# Patient Record
Sex: MALE | Race: WHITE | NOT HISPANIC OR LATINO | Employment: OTHER
[De-identification: names, ages, dates, MRNs, and addresses within clinical notes are randomized per-mention and may not be internally consistent; named-entity substitution may affect disease eponyms.]

---

## 2017-02-07 ENCOUNTER — MYAURORA ACCOUNT LINK (OUTPATIENT)
Dept: OTHER | Age: 67
End: 2017-02-07

## 2017-02-07 ENCOUNTER — CHARTING TRANS (OUTPATIENT)
Dept: OTHER | Age: 67
End: 2017-02-07

## 2018-05-05 ENCOUNTER — LAB SERVICES (OUTPATIENT)
Dept: OTHER | Age: 68
End: 2018-05-05

## 2018-05-05 ENCOUNTER — IMAGING SERVICES (OUTPATIENT)
Dept: OTHER | Age: 68
End: 2018-05-05

## 2018-05-05 ENCOUNTER — HOSPITAL (OUTPATIENT)
Dept: OTHER | Age: 68
End: 2018-05-05
Attending: INTERNAL MEDICINE

## 2018-05-05 ENCOUNTER — CHARTING TRANS (OUTPATIENT)
Dept: OTHER | Age: 68
End: 2018-05-05

## 2018-05-05 LAB
ALBUMIN SERPL-MCNC: 4 G/DL (ref 3.6–5.1)
ALBUMIN SERPL-MCNC: 4 GM/DL (ref 3.6–5.1)
ALBUMIN/GLOB SERPL: 1.2 (ref 1–2.4)
ALBUMIN/GLOB SERPL: 1.2 {RATIO} (ref 1–2.4)
ALP SERPL-CCNC: 93 UNIT/L (ref 45–117)
ALP SERPL-CCNC: 93 UNITS/L (ref 45–117)
ALT SERPL-CCNC: 35 UNIT/L
ALT SERPL-CCNC: 35 UNITS/L
ANALYZER ANC (IANC): NORMAL
ANALYZER ANC (IANC): NORMAL
ANION GAP SERPL CALC-SCNC: 13 MMOL/L (ref 10–20)
ANION GAP SERPL CALC-SCNC: 13 MMOL/L (ref 10–20)
APTT PPP: 33 SEC (ref 22–30)
APTT PPP: 33 SECONDS (ref 22–30)
APTT PPP: ABNORMAL
APTT PPP: ABNORMAL S
AST SERPL-CCNC: 51 UNIT/L
AST SERPL-CCNC: 51 UNITS/L
BASOPHILS # BLD: 0 K/MCL (ref 0–0.3)
BASOPHILS # BLD: 0 THOUSAND/MCL (ref 0–0.3)
BASOPHILS NFR BLD: 0 %
BASOPHILS NFR BLD: 0 %
BILIRUB SERPL-MCNC: 0.6 MG/DL (ref 0.2–1)
BILIRUB SERPL-MCNC: 0.6 MG/DL (ref 0.2–1)
BUN SERPL-MCNC: 15 MG/DL (ref 6–20)
BUN SERPL-MCNC: 15 MG/DL (ref 6–20)
BUN/CREAT SERPL: 17 (ref 7–25)
BUN/CREAT SERPL: 17 (ref 7–25)
CALCIUM SERPL-MCNC: 9.2 MG/DL (ref 8.4–10.2)
CALCIUM SERPL-MCNC: 9.2 MG/DL (ref 8.4–10.2)
CHLORIDE SERPL-SCNC: 107 MMOL/L (ref 98–107)
CHLORIDE: 107 MMOL/L (ref 98–107)
CO2 SERPL-SCNC: 24 MMOL/L (ref 21–32)
CO2 SERPL-SCNC: 24 MMOL/L (ref 21–32)
CREAT SERPL-MCNC: 0.9 MG/DL (ref 0.67–1.17)
CREAT SERPL-MCNC: 0.9 MG/DL (ref 0.67–1.17)
DIFFERENTIAL METHOD BLD: NORMAL
DIFFERENTIAL METHOD BLD: NORMAL
EOSINOPHIL # BLD: 0.3 K/MCL (ref 0.1–0.5)
EOSINOPHIL # BLD: 0.3 THOUSAND/MCL (ref 0.1–0.5)
EOSINOPHIL NFR BLD: 4 %
EOSINOPHIL NFR BLD: 4 %
ERYTHROCYTE [DISTWIDTH] IN BLOOD: 12.7 % (ref 11–15)
ERYTHROCYTE [DISTWIDTH] IN BLOOD: 12.7 % (ref 11–15)
GLOBULIN SER-MCNC: 3.3 G/DL (ref 2–4)
GLOBULIN SER-MCNC: 3.3 GM/DL (ref 2–4)
GLUCOSE BLDC GLUCOMTR-MCNC: 86 MG/DL (ref 65–99)
GLUCOSE SERPL-MCNC: 98 MG/DL (ref 65–99)
GLUCOSE SERPL-MCNC: 98 MG/DL (ref 65–99)
HEMATOCRIT: 46.5 % (ref 39–51)
HEMATOCRIT: 46.5 % (ref 39–51)
HEMOGLOBIN: 15.8 G/DL (ref 13–17)
HGB BLD-MCNC: 15.8 GM/DL (ref 13–17)
INR PPP: 1
INR PPP: 1
LYMPHOCYTES # BLD: 1.7 K/MCL (ref 1–4)
LYMPHOCYTES # BLD: 1.7 THOUSAND/MCL (ref 1–4)
LYMPHOCYTES NFR BLD: 23 %
LYMPHOCYTES NFR BLD: 23 %
MAGNESIUM SERPL-MCNC: 1.8 MG/DL (ref 1.7–2.4)
MAGNESIUM SERPL-MCNC: 1.8 MG/DL (ref 1.7–2.4)
MCH RBC QN AUTO: 31.2 PG (ref 26–34)
MCHC RBC AUTO-ENTMCNC: 34 GM/DL (ref 32–36.5)
MCV RBC AUTO: 91.7 FL (ref 78–100)
MEAN CORPUSCULAR HEMOGLOBIN: 31.2 PG (ref 26–34)
MEAN CORPUSCULAR HGB CONC: 34 G/DL (ref 32–36.5)
MEAN CORPUSCULAR VOLUME: 91.7 FL (ref 78–100)
MONOCYTES # BLD: 0.9 K/MCL (ref 0.3–0.9)
MONOCYTES # BLD: 0.9 THOUSAND/MCL (ref 0.3–0.9)
MONOCYTES NFR BLD: 12 %
MONOCYTES NFR BLD: 12 %
NEUTROPHILS # BLD: 4.6 K/MCL (ref 1.8–7.7)
NEUTROPHILS # BLD: 4.6 THOUSAND/MCL (ref 1.8–7.7)
NEUTROPHILS NFR BLD: 61 %
NEUTROPHILS NFR BLD: 61 %
NEUTS SEG NFR BLD: NORMAL
NEUTS SEG NFR BLD: NORMAL %
NRBC (NRBCRE): NORMAL
PERCENT NRBC: NORMAL
PLATELET # BLD: 226 THOUSAND/MCL (ref 140–450)
PLATELET COUNT: 226 K/MCL (ref 140–450)
POTASSIUM SERPL-SCNC: 4.7 MMOL/L (ref 3.4–5.1)
POTASSIUM SERPL-SCNC: 4.7 MMOL/L (ref 3.4–5.1)
PROT SERPL-MCNC: 7.3 GM/DL (ref 6.4–8.2)
PROTHROMBIN TIME (PRT2): NORMAL
PROTHROMBIN TIME: 10.5 SEC (ref 9.7–11.8)
PROTHROMBIN TIME: 10.5 SECONDS (ref 9.7–11.8)
PROTHROMBIN TIME: NORMAL
RBC # BLD: 5.07 MILLION/MCL (ref 4.5–5.9)
RED CELL COUNT: 5.07 MIL/MCL (ref 4.5–5.9)
SODIUM SERPL-SCNC: 139 MMOL/L (ref 135–145)
SODIUM SERPL-SCNC: 139 MMOL/L (ref 135–145)
TOTAL PROTEIN: 7.3 G/DL (ref 6.4–8.2)
TROPONIN I SERPL HS-MCNC: 3.39 NG/ML
TROPONIN I SERPL HS-MCNC: 3.39 NG/ML
TROPONIN I SERPL HS-MCNC: 4.54 NG/ML
TROPONIN I SERPL HS-MCNC: 4.54 NG/ML
TROPONIN I SERPL HS-MCNC: 5.42 NG/ML
TROPONIN I SERPL HS-MCNC: 5.42 NG/ML
WBC # BLD: 7.6 THOUSAND/MCL (ref 4.2–11)
WHITE BLOOD COUNT: 7.6 K/MCL (ref 4.2–11)

## 2018-05-06 LAB
ANALYZER ANC (IANC): NORMAL
ANION GAP SERPL CALC-SCNC: 12 MMOL/L (ref 10–20)
BASOPHILS # BLD: 0 THOUSAND/MCL (ref 0–0.3)
BASOPHILS NFR BLD: 0 %
BUN SERPL-MCNC: 13 MG/DL (ref 6–20)
BUN/CREAT SERPL: 14 (ref 7–25)
CALCIUM SERPL-MCNC: 8.4 MG/DL (ref 8.4–10.2)
CHLORIDE: 107 MMOL/L (ref 98–107)
CHOLEST SERPL-MCNC: 172 MG/DL
CHOLEST/HDLC SERPL: 4.9 {RATIO}
CO2 SERPL-SCNC: 26 MMOL/L (ref 21–32)
CREAT SERPL-MCNC: 0.93 MG/DL (ref 0.67–1.17)
DIFFERENTIAL METHOD BLD: NORMAL
EOSINOPHIL # BLD: 0.3 THOUSAND/MCL (ref 0.1–0.5)
EOSINOPHIL NFR BLD: 5 %
ERYTHROCYTE [DISTWIDTH] IN BLOOD: 12.6 % (ref 11–15)
GLUCOSE SERPL-MCNC: 96 MG/DL (ref 65–99)
GLYCOHEMOGLOBIN: 5.3 % (ref 4.5–5.6)
HDLC SERPL-MCNC: 35 MG/DL
HEMATOCRIT: 44.2 % (ref 39–51)
HGB BLD-MCNC: 15.4 GM/DL (ref 13–17)
LDLC SERPL CALC-MCNC: 106 MG/DL
LYMPHOCYTES # BLD: 1.8 THOUSAND/MCL (ref 1–4)
LYMPHOCYTES NFR BLD: 26 %
MCH RBC QN AUTO: 32.4 PG (ref 26–34)
MCHC RBC AUTO-ENTMCNC: 34.8 GM/DL (ref 32–36.5)
MCV RBC AUTO: 93.1 FL (ref 78–100)
MONOCYTES # BLD: 0.8 THOUSAND/MCL (ref 0.3–0.9)
MONOCYTES NFR BLD: 12 %
NEUTROPHILS # BLD: 3.8 THOUSAND/MCL (ref 1.8–7.7)
NEUTROPHILS NFR BLD: 57 %
NEUTS SEG NFR BLD: NORMAL %
NONHDLC SERPL-MCNC: 137 MG/DL
PERCENT NRBC: NORMAL
PLATELET # BLD: 200 THOUSAND/MCL (ref 140–450)
POTASSIUM SERPL-SCNC: 4.2 MMOL/L (ref 3.4–5.1)
RBC # BLD: 4.75 MILLION/MCL (ref 4.5–5.9)
SODIUM SERPL-SCNC: 141 MMOL/L (ref 135–145)
TRIGLYCERIDE (TRIGP): 153 MG/DL
WBC # BLD: 6.7 THOUSAND/MCL (ref 4.2–11)

## 2018-05-07 ENCOUNTER — DIAGNOSTIC TRANS (OUTPATIENT)
Dept: OTHER | Age: 68
End: 2018-05-07

## 2018-05-07 ENCOUNTER — CHARTING TRANS (OUTPATIENT)
Dept: OTHER | Age: 68
End: 2018-05-07

## 2018-05-07 LAB
APPEARANCE UR: CLEAR
BACTERIA #/AREA URNS HPF: NORMAL /HPF
BILIRUB UR QL: NEGATIVE
COLOR UR: YELLOW
GLUCOSE UR-MCNC: NEGATIVE MG/DL
HGB UR QL: NEGATIVE
HYALINE CASTS #/AREA URNS LPF: NORMAL /LPF (ref 0–5)
KETONES UR-MCNC: NEGATIVE MG/DL
LEUKOCYTE ESTERASE UR QL STRIP: NEGATIVE
NITRITE UR QL: NEGATIVE
PH UR: 6 UNIT (ref 5–7)
PROT UR QL: NEGATIVE MG/DL
RBC #/AREA URNS HPF: NORMAL /HPF (ref 0–3)
SP GR UR: 1.02 (ref 1–1.03)
SPECIMEN SOURCE: NORMAL
SQUAMOUS #/AREA URNS HPF: NORMAL /HPF (ref 0–5)
UROBILINOGEN UR QL: 0.2 MG/DL (ref 0–1)
WBC #/AREA URNS HPF: NORMAL /HPF (ref 0–5)

## 2018-05-08 LAB
ANALYZER ANC (IANC): ABNORMAL
ANALYZER ANC (IANC): ABNORMAL
ANION GAP SERPL CALC-SCNC: 13 MMOL/L (ref 10–20)
APTT PPP: 35 SECONDS (ref 22–30)
APTT PPP: 35 SECONDS (ref 22–30)
APTT PPP: ABNORMAL S
APTT PPP: ABNORMAL S
BASE DEFICIT BLDA-SCNC: 0 MMOL/L (ref 0–2)
BASE DEFICIT BLDA-SCNC: 1 MMOL/L (ref 0–2)
BASE DEFICIT BLDA-SCNC: 2 MMOL/L (ref 0–2)
BASE DEFICIT BLDA-SCNC: 2 MMOL/L (ref 0–2)
BASE DEFICIT BLDA-SCNC: ABNORMAL MMOL/L
BASE EXCESS BLDA CALC-SCNC: 0 MMOL/L (ref 0–3)
BASE EXCESS BLDA CALC-SCNC: 1 MMOL/L (ref 0–3)
BASE EXCESS BLDA CALC-SCNC: 3 MMOL/L (ref 0–3)
BASE EXCESS BLDA CALC-SCNC: ABNORMAL MMOL/L
BASOPHILS # BLD: 0 THOUSAND/MCL (ref 0–0.3)
BASOPHILS # BLD: 0 THOUSAND/MCL (ref 0–0.3)
BASOPHILS NFR BLD: 0 %
BASOPHILS NFR BLD: 0 %
BDY SITE: ABNORMAL
BODY TEMPERATURE: 37 DEGREES
BUN SERPL-MCNC: 13 MG/DL (ref 6–20)
BUN/CREAT SERPL: 15 (ref 7–25)
CA-I BLD ISE-SCNC: 1.02 MMOL/L (ref 1.15–1.29)
CA-I BLD ISE-SCNC: 1.05 MMOL/L (ref 1.15–1.29)
CA-I BLD ISE-SCNC: 1.06 MMOL/L (ref 1.15–1.29)
CA-I BLD ISE-SCNC: 1.06 MMOL/L (ref 1.15–1.29)
CA-I BLD ISE-SCNC: 1.08 MMOL/L (ref 1.15–1.29)
CA-I BLD ISE-SCNC: 1.12 MMOL/L (ref 1.15–1.29)
CA-I BLD ISE-SCNC: 1.14 MMOL/L (ref 1.15–1.29)
CA-I BLD ISE-SCNC: 1.18 MMOL/L (ref 1.15–1.29)
CA-I BLD ISE-SCNC: 1.22 MMOL/L (ref 1.15–1.29)
CA-I BLDA-SCNC: 22 % (ref 15–23)
CALCIUM SERPL-MCNC: 8 MG/DL (ref 8.4–10.2)
CHLORIDE BLD-SCNC: 105 MMOL/L (ref 98–107)
CHLORIDE BLD-SCNC: 106 MMOL/L (ref 98–107)
CHLORIDE BLD-SCNC: 107 MMOL/L (ref 98–107)
CHLORIDE BLD-SCNC: 108 MMOL/L (ref 98–107)
CHLORIDE: 109 MMOL/L (ref 98–107)
CO2 BLD-SCNC: 24 MMOL/L (ref 19–24)
CO2 BLD-SCNC: 27 MMOL/L (ref 19–24)
CO2 BLD-SCNC: 27 MMOL/L (ref 19–24)
CO2 BLD-SCNC: 28 MMOL/L (ref 19–24)
CO2 BLD-SCNC: 29 MMOL/L (ref 19–24)
CO2 BLD-SCNC: 30 MMOL/L (ref 19–24)
CO2 SERPL-SCNC: 26 MMOL/L (ref 21–32)
COHGB MFR BLD: 1.8 %
COHGB MFR BLD: 2 %
COHGB MFR BLD: 2.1 %
CONDITION: ABNORMAL
CREAT SERPL-MCNC: 0.88 MG/DL (ref 0.67–1.17)
DIFFERENTIAL METHOD BLD: ABNORMAL
DIFFERENTIAL METHOD BLD: ABNORMAL
EOSINOPHIL # BLD: 0.2 THOUSAND/MCL (ref 0.1–0.5)
EOSINOPHIL # BLD: 0.2 THOUSAND/MCL (ref 0.1–0.5)
EOSINOPHIL NFR BLD: 1 %
EOSINOPHIL NFR BLD: 1 %
ERYTHROCYTE [DISTWIDTH] IN BLOOD: 12.5 % (ref 11–15)
ERYTHROCYTE [DISTWIDTH] IN BLOOD: 12.7 % (ref 11–15)
GLUCOSE BLD-MCNC: 106 MG/DL (ref 65–99)
GLUCOSE BLD-MCNC: 109 MG/DL (ref 65–99)
GLUCOSE BLD-MCNC: 127 MG/DL (ref 65–99)
GLUCOSE BLD-MCNC: 130 MG/DL (ref 65–99)
GLUCOSE BLD-MCNC: 135 MG/DL (ref 65–99)
GLUCOSE BLD-MCNC: 149 MG/DL (ref 65–99)
GLUCOSE BLD-MCNC: 153 MG/DL (ref 65–99)
GLUCOSE BLD-MCNC: 154 MG/DL (ref 65–99)
GLUCOSE BLD-MCNC: 96 MG/DL (ref 65–99)
GLUCOSE BLDC GLUCOMTR-MCNC: 113 MG/DL (ref 65–99)
GLUCOSE BLDC GLUCOMTR-MCNC: 124 MG/DL (ref 65–99)
GLUCOSE BLDC GLUCOMTR-MCNC: 136 MG/DL (ref 65–99)
GLUCOSE BLDC GLUCOMTR-MCNC: 138 MG/DL (ref 65–99)
GLUCOSE BLDC GLUCOMTR-MCNC: 155 MG/DL (ref 65–99)
GLUCOSE BLDC GLUCOMTR-MCNC: 162 MG/DL (ref 65–99)
GLUCOSE SERPL-MCNC: 112 MG/DL (ref 65–99)
HCO3 BLDA-SCNC: 23 MMOL/L (ref 22–28)
HCO3 BLDA-SCNC: 23 MMOL/L (ref 22–28)
HCO3 BLDA-SCNC: 25 MMOL/L (ref 22–28)
HCO3 BLDA-SCNC: 25 MMOL/L (ref 22–28)
HCO3 BLDA-SCNC: 26 MMOL/L (ref 22–28)
HCO3 BLDA-SCNC: 27 MMOL/L (ref 22–28)
HCO3 BLDA-SCNC: 29 MMOL/L (ref 22–28)
HCT VFR BLD CALC: 34 % (ref 39–51)
HCT VFR BLD CALC: 35 % (ref 39–51)
HCT VFR BLD CALC: 36 % (ref 39–51)
HCT VFR BLD CALC: 44 % (ref 39–51)
HEMATOCRIT: 34.7 % (ref 39–51)
HEMATOCRIT: 42.7 % (ref 39–51)
HGB BLD-MCNC: 11.4 GM/DL (ref 13–17)
HGB BLD-MCNC: 11.5 GM/DL (ref 13–17)
HGB BLD-MCNC: 11.5 GM/DL (ref 13–17)
HGB BLD-MCNC: 11.7 GM/DL (ref 13–17)
HGB BLD-MCNC: 11.9 GM/DL (ref 13–17)
HGB BLD-MCNC: 12.2 GM/DL (ref 13–17)
HGB BLD-MCNC: 14.7 GM/DL (ref 13–17)
HGB BLD-MCNC: 14.9 GM/DL (ref 13–17)
HGB BLD-MCNC: 15.2 GM/DL (ref 13–17)
HGB BLD-MCNC: 16.5 GM/DL (ref 13–17)
HGB BLD-MCNC: 16.7 GM/DL (ref 13–17)
HOROWITZ INDEX BLD+IHG-RTO: 100 %
HOROWITZ INDEX BLD+IHG-RTO: 40 %
HOROWITZ INDEX BLD+IHG-RTO: 50 %
INR PPP: 1.2
INR PPP: 1.4
LACTATE BLDA-MCNC: 1.1 MMOL/L
LACTATE BLDA-MCNC: 1.2 MMOL/L
LACTATE BLDA-MCNC: 1.2 MMOL/L
LACTATE BLDA-MCNC: 1.3 MMOL/L
LACTATE BLDA-MCNC: 1.3 MMOL/L
LACTATE BLDA-MCNC: 1.6 MMOL/L
LACTATE BLDA-MCNC: 1.7 MMOL/L
LACTATE BLDA-MCNC: 1.8 MMOL/L
LACTATE BLDA-MCNC: 1.8 MMOL/L
LYMPHOCYTES # BLD: 1.1 THOUSAND/MCL (ref 1–4)
LYMPHOCYTES # BLD: 1.4 THOUSAND/MCL (ref 1–4)
LYMPHOCYTES NFR BLD: 6 %
LYMPHOCYTES NFR BLD: 8 %
MAGNESIUM SERPL-MCNC: 2.4 MG/DL (ref 1.7–2.4)
MCH RBC QN AUTO: 32 PG (ref 26–34)
MCH RBC QN AUTO: 32.4 PG (ref 26–34)
MCHC RBC AUTO-ENTMCNC: 34.9 GM/DL (ref 32–36.5)
MCHC RBC AUTO-ENTMCNC: 35.2 GM/DL (ref 32–36.5)
MCV RBC AUTO: 91.6 FL (ref 78–100)
MCV RBC AUTO: 92 FL (ref 78–100)
METHGB MFR BLD: 0.5 %
METHGB MFR BLD: 0.5 %
METHGB MFR BLD: 0.6 %
METHGB MFR BLD: 0.7 %
METHGB MFR BLD: 0.9 %
METHGB MFR BLD: 1.2 %
MONOCYTES # BLD: 0.4 THOUSAND/MCL (ref 0.3–0.9)
MONOCYTES # BLD: 1.8 THOUSAND/MCL (ref 0.3–0.9)
MONOCYTES NFR BLD: 10 %
MONOCYTES NFR BLD: 3 %
NEUTROPHILS # BLD: 14.8 THOUSAND/MCL (ref 1.8–7.7)
NEUTROPHILS # BLD: 15.1 THOUSAND/MCL (ref 1.8–7.7)
NEUTROPHILS NFR BLD: 83 %
NEUTROPHILS NFR BLD: 88 %
NEUTS SEG NFR BLD: ABNORMAL %
NEUTS SEG NFR BLD: ABNORMAL %
OXYHGB MFR BLD: 94.1 % (ref 94–98)
OXYHGB MFR BLD: 95.6 % (ref 94–98)
OXYHGB MFR BLD: 97 % (ref 94–98)
OXYHGB MFR BLDA: 96.1 % (ref 94–98)
OXYHGB MFR BLDA: 96.3 % (ref 94–98)
OXYHGB MFR BLDA: 96.4 % (ref 94–98)
OXYHGB MFR BLDA: 96.6 % (ref 94–98)
PCO2 BLDA: 38 MM HG (ref 35–48)
PCO2 BLDA: 39 MM HG (ref 35–48)
PCO2 BLDA: 44 MM HG (ref 35–48)
PCO2 BLDA: 45 MM HG (ref 35–48)
PCO2 BLDA: 46 MM HG (ref 35–48)
PCO2 BLDA: 47 MM HG (ref 35–48)
PCO2 BLDA: 48 MM HG (ref 35–48)
PERCENT NRBC: ABNORMAL
PERCENT NRBC: ABNORMAL
PH BLDA: 7.36 UNIT (ref 7.35–7.45)
PH BLDA: 7.37 UNIT (ref 7.35–7.45)
PH BLDA: 7.37 UNIT (ref 7.35–7.45)
PH BLDA: 7.38 UNIT (ref 7.35–7.45)
PH BLDA: 7.38 UNIT (ref 7.35–7.45)
PH BLDA: 7.39 UNIT (ref 7.35–7.45)
PH BLDA: 7.4 UNIT (ref 7.35–7.45)
PLATELET # BLD: 126 THOUSAND/MCL (ref 140–450)
PLATELET # BLD: 140 THOUSAND/MCL (ref 140–450)
PO2 BLDA: 103 MM HG (ref 83–108)
PO2 BLDA: 156 MM HG (ref 83–108)
PO2 BLDA: 163 MM HG (ref 83–108)
PO2 BLDA: 208 MM HG (ref 83–108)
PO2 BLDA: 231 MM HG (ref 83–108)
PO2 BLDA: 350 MM HG (ref 83–108)
PO2 BLDA: 424 MM HG (ref 83–108)
PO2 BLDA: 430 MM HG (ref 83–108)
PO2 BLDA: 75 MM HG (ref 83–108)
POTASSIUM BLD-SCNC: 4 MMOL/L (ref 3.4–5.1)
POTASSIUM BLD-SCNC: 4.1 MMOL/L (ref 3.4–5.1)
POTASSIUM BLD-SCNC: 4.3 MMOL/L (ref 3.4–5.1)
POTASSIUM BLD-SCNC: 4.5 MMOL/L (ref 3.4–5.1)
POTASSIUM BLD-SCNC: 4.7 MMOL/L (ref 3.4–5.1)
POTASSIUM BLD-SCNC: 4.8 MMOL/L (ref 3.4–5.1)
POTASSIUM BLD-SCNC: 5.1 MMOL/L (ref 3.4–5.1)
POTASSIUM BLD-SCNC: 5.5 MMOL/L (ref 3.4–5.1)
POTASSIUM BLD-SCNC: 5.8 MMOL/L (ref 3.4–5.1)
POTASSIUM SERPL-SCNC: 4.4 MMOL/L (ref 3.4–5.1)
PROTHROMBIN TIME: 12.3 SECONDS (ref 9.7–11.8)
PROTHROMBIN TIME: 14.2 SECONDS (ref 9.7–11.8)
PROTHROMBIN TIME: ABNORMAL
PROTHROMBIN TIME: ABNORMAL
RBC # BLD: 3.77 MILLION/MCL (ref 4.5–5.9)
RBC # BLD: 4.66 MILLION/MCL (ref 4.5–5.9)
SAO2 % BLDA: 100 %
SAO2 % BLDA: 97 % (ref 95–99)
SAO2 % BLDA: 98 % (ref 95–99)
SAO2 % BLDA: 99 %
SAO2 % BLDA: 99 % (ref 95–99)
SODIUM BLD-SCNC: 132 MMOL/L (ref 135–145)
SODIUM BLD-SCNC: 134 MMOL/L (ref 135–145)
SODIUM BLD-SCNC: 135 MMOL/L (ref 135–145)
SODIUM BLD-SCNC: 136 MMOL/L (ref 135–145)
SODIUM SERPL-SCNC: 144 MMOL/L (ref 135–145)
WBC # BLD: 16.8 THOUSAND/MCL (ref 4.2–11)
WBC # BLD: 18.2 THOUSAND/MCL (ref 4.2–11)

## 2018-05-09 ENCOUNTER — CHARTING TRANS (OUTPATIENT)
Dept: OTHER | Age: 68
End: 2018-05-09

## 2018-05-09 LAB
ALBUMIN SERPL-MCNC: 2.7 GM/DL (ref 3.6–5.1)
ALP SERPL-CCNC: 72 UNIT/L (ref 45–117)
ALT SERPL-CCNC: 33 UNIT/L
ANALYZER ANC (IANC): ABNORMAL
ANALYZER ANC (IANC): ABNORMAL
ANION GAP SERPL CALC-SCNC: 16 MMOL/L (ref 10–20)
AST SERPL-CCNC: 45 UNIT/L
BASOPHILS # BLD: 0 THOUSAND/MCL (ref 0–0.3)
BASOPHILS # BLD: 0 THOUSAND/MCL (ref 0–0.3)
BASOPHILS NFR BLD: 0 %
BASOPHILS NFR BLD: 0 %
BILIRUB CONJ SERPL-MCNC: 0.2 MG/DL (ref 0–0.2)
BILIRUB SERPL-MCNC: 0.8 MG/DL (ref 0.2–1)
BUN SERPL-MCNC: 16 MG/DL (ref 6–20)
BUN/CREAT SERPL: 16 (ref 7–25)
CALCIUM SERPL-MCNC: 7.6 MG/DL (ref 8.4–10.2)
CHLORIDE: 104 MMOL/L (ref 98–107)
CO2 SERPL-SCNC: 22 MMOL/L (ref 21–32)
CREAT SERPL-MCNC: 1.03 MG/DL (ref 0.67–1.17)
DIFFERENTIAL METHOD BLD: ABNORMAL
DIFFERENTIAL METHOD BLD: ABNORMAL
EOSINOPHIL # BLD: 0 THOUSAND/MCL (ref 0.1–0.5)
EOSINOPHIL # BLD: 0 THOUSAND/MCL (ref 0.1–0.5)
EOSINOPHIL NFR BLD: 0 %
EOSINOPHIL NFR BLD: 0 %
ERYTHROCYTE [DISTWIDTH] IN BLOOD: 12.7 % (ref 11–15)
ERYTHROCYTE [DISTWIDTH] IN BLOOD: 12.9 % (ref 11–15)
GLUCOSE BLDC GLUCOMTR-MCNC: 110 MG/DL (ref 65–99)
GLUCOSE BLDC GLUCOMTR-MCNC: 118 MG/DL (ref 65–99)
GLUCOSE BLDC GLUCOMTR-MCNC: 125 MG/DL (ref 65–99)
GLUCOSE BLDC GLUCOMTR-MCNC: 153 MG/DL (ref 65–99)
GLUCOSE BLDC GLUCOMTR-MCNC: 165 MG/DL (ref 65–99)
GLUCOSE BLDC GLUCOMTR-MCNC: 172 MG/DL (ref 65–99)
GLUCOSE BLDC GLUCOMTR-MCNC: 201 MG/DL (ref 65–99)
GLUCOSE BLDC GLUCOMTR-MCNC: 212 MG/DL (ref 65–99)
GLUCOSE BLDC GLUCOMTR-MCNC: 89 MG/DL (ref 65–99)
GLUCOSE BLDC GLUCOMTR-MCNC: 92 MG/DL (ref 65–99)
GLUCOSE BLDC GLUCOMTR-MCNC: 95 MG/DL (ref 65–99)
GLUCOSE BLDC GLUCOMTR-MCNC: 99 MG/DL (ref 65–99)
GLUCOSE SERPL-MCNC: 190 MG/DL (ref 65–99)
HEMATOCRIT: 37.2 % (ref 39–51)
HEMATOCRIT: 40.2 % (ref 39–51)
HGB BLD-MCNC: 13 GM/DL (ref 13–17)
HGB BLD-MCNC: 13.9 GM/DL (ref 13–17)
LARGE PLATELETS (PLTL): PRESENT
LYMPHOCYTES # BLD: 1.5 THOUSAND/MCL (ref 1–4)
LYMPHOCYTES # BLD: 1.7 THOUSAND/MCL (ref 1–4)
LYMPHOCYTES NFR BLD: 6 %
LYMPHOCYTES NFR BLD: 8 %
MAGNESIUM SERPL-MCNC: 1.8 MG/DL (ref 1.7–2.4)
MCH RBC QN AUTO: 32 PG (ref 26–34)
MCH RBC QN AUTO: 32.3 PG (ref 26–34)
MCHC RBC AUTO-ENTMCNC: 34.6 GM/DL (ref 32–36.5)
MCHC RBC AUTO-ENTMCNC: 34.9 GM/DL (ref 32–36.5)
MCV RBC AUTO: 92.3 FL (ref 78–100)
MCV RBC AUTO: 92.6 FL (ref 78–100)
MONOCYTES # BLD: 1.5 THOUSAND/MCL (ref 0.3–0.9)
MONOCYTES # BLD: 4 THOUSAND/MCL (ref 0.3–0.9)
MONOCYTES NFR BLD: 15 %
MONOCYTES NFR BLD: 7 %
NEUTROPHILS # BLD: 18.3 THOUSAND/MCL (ref 1.8–7.7)
NEUTROPHILS # BLD: 20.9 THOUSAND/MCL (ref 1.8–7.7)
NEUTROPHILS NFR BLD: 79 %
NEUTS SEG NFR BLD: 85 %
NEUTS SEG NFR BLD: ABNORMAL %
PATH REV BLD -IMP: ABNORMAL
PERCENT NRBC: ABNORMAL
PERCENT NRBC: ABNORMAL
PLAT MORPH BLD: NORMAL
PLATELET # BLD: 161 THOUSAND/MCL (ref 140–450)
PLATELET # BLD: 163 THOUSAND/MCL (ref 140–450)
POTASSIUM SERPL-SCNC: 4.5 MMOL/L (ref 3.4–5.1)
PROT SERPL-MCNC: 5.3 GM/DL (ref 6.4–8.2)
RBC # BLD: 4.03 MILLION/MCL (ref 4.5–5.9)
RBC # BLD: 4.34 MILLION/MCL (ref 4.5–5.9)
RBC MORPH BLD: NORMAL
RBC MORPH BLD: NORMAL
SODIUM SERPL-SCNC: 137 MMOL/L (ref 135–145)
WBC # BLD: 21.5 THOUSAND/MCL (ref 4.2–11)
WBC # BLD: 26.4 THOUSAND/MCL (ref 4.2–11)
WBC MORPH BLD: NORMAL
WBC MORPH BLD: NORMAL

## 2018-05-10 LAB
ANALYZER ANC (IANC): ABNORMAL
ANION GAP SERPL CALC-SCNC: 11 MMOL/L (ref 10–20)
BASOPHILS # BLD: 0 THOUSAND/MCL (ref 0–0.3)
BASOPHILS NFR BLD: 0 %
BUN SERPL-MCNC: 26 MG/DL (ref 6–20)
BUN/CREAT SERPL: 19 (ref 7–25)
CALCIUM SERPL-MCNC: 7.5 MG/DL (ref 8.4–10.2)
CHLORIDE: 102 MMOL/L (ref 98–107)
CO2 SERPL-SCNC: 26 MMOL/L (ref 21–32)
CREAT SERPL-MCNC: 1.35 MG/DL (ref 0.67–1.17)
DIFFERENTIAL METHOD BLD: ABNORMAL
EOSINOPHIL # BLD: 0 THOUSAND/MCL (ref 0.1–0.5)
EOSINOPHIL NFR BLD: 0 %
ERYTHROCYTE [DISTWIDTH] IN BLOOD: 12.9 % (ref 11–15)
GLUCOSE BLDC GLUCOMTR-MCNC: 112 MG/DL (ref 65–99)
GLUCOSE SERPL-MCNC: 115 MG/DL (ref 65–99)
HEMATOCRIT: 29.3 % (ref 39–51)
HGB BLD-MCNC: 10.1 GM/DL (ref 13–17)
LYMPHOCYTES # BLD: 1.4 THOUSAND/MCL (ref 1–4)
LYMPHOCYTES NFR BLD: 8 %
MAGNESIUM SERPL-MCNC: 1.9 MG/DL (ref 1.7–2.4)
MCH RBC QN AUTO: 31.8 PG (ref 26–34)
MCHC RBC AUTO-ENTMCNC: 34.5 GM/DL (ref 32–36.5)
MCV RBC AUTO: 92.1 FL (ref 78–100)
MONOCYTES # BLD: 2.2 THOUSAND/MCL (ref 0.3–0.9)
MONOCYTES NFR BLD: 14 %
NEUTROPHILS # BLD: 12.5 THOUSAND/MCL (ref 1.8–7.7)
NEUTROPHILS NFR BLD: 78 %
NEUTS SEG NFR BLD: ABNORMAL %
PERCENT NRBC: ABNORMAL
PLATELET # BLD: 120 THOUSAND/MCL (ref 140–450)
POTASSIUM SERPL-SCNC: 4.3 MMOL/L (ref 3.4–5.1)
RBC # BLD: 3.18 MILLION/MCL (ref 4.5–5.9)
SODIUM SERPL-SCNC: 135 MMOL/L (ref 135–145)
WBC # BLD: 16.1 THOUSAND/MCL (ref 4.2–11)

## 2018-05-11 LAB
ALBUMIN SERPL-MCNC: 2.8 GM/DL (ref 3.6–5.1)
ALBUMIN/GLOB SERPL: 0.9 {RATIO} (ref 1–2.4)
ALP SERPL-CCNC: 85 UNIT/L (ref 45–117)
ALT SERPL-CCNC: 19 UNIT/L
ANALYZER ANC (IANC): ABNORMAL
ANION GAP SERPL CALC-SCNC: 11 MMOL/L (ref 10–20)
AST SERPL-CCNC: 30 UNIT/L
BASOPHILS # BLD: 0 THOUSAND/MCL (ref 0–0.3)
BASOPHILS NFR BLD: 0 %
BILIRUB SERPL-MCNC: 0.9 MG/DL (ref 0.2–1)
BUN SERPL-MCNC: 24 MG/DL (ref 6–20)
BUN/CREAT SERPL: 24 (ref 7–25)
CALCIUM SERPL-MCNC: 7.9 MG/DL (ref 8.4–10.2)
CHLORIDE: 100 MMOL/L (ref 98–107)
CO2 SERPL-SCNC: 28 MMOL/L (ref 21–32)
CREAT SERPL-MCNC: 0.98 MG/DL (ref 0.67–1.17)
DIFFERENTIAL METHOD BLD: ABNORMAL
EOSINOPHIL # BLD: 0.1 THOUSAND/MCL (ref 0.1–0.5)
EOSINOPHIL NFR BLD: 1 %
ERYTHROCYTE [DISTWIDTH] IN BLOOD: 12.7 % (ref 11–15)
GLOBULIN SER-MCNC: 3 GM/DL (ref 2–4)
GLUCOSE BLDC GLUCOMTR-MCNC: 108 MG/DL (ref 65–99)
GLUCOSE BLDC GLUCOMTR-MCNC: 110 MG/DL (ref 65–99)
GLUCOSE BLDC GLUCOMTR-MCNC: 85 MG/DL (ref 65–99)
GLUCOSE SERPL-MCNC: 116 MG/DL (ref 65–99)
HEMATOCRIT: 26.9 % (ref 39–51)
HGB BLD-MCNC: 9.2 GM/DL (ref 13–17)
LYMPHOCYTES # BLD: 1.3 THOUSAND/MCL (ref 1–4)
LYMPHOCYTES NFR BLD: 11 %
MAGNESIUM SERPL-MCNC: 2.2 MG/DL (ref 1.7–2.4)
MCH RBC QN AUTO: 31.9 PG (ref 26–34)
MCHC RBC AUTO-ENTMCNC: 34.2 GM/DL (ref 32–36.5)
MCV RBC AUTO: 93.4 FL (ref 78–100)
MONOCYTES # BLD: 1.6 THOUSAND/MCL (ref 0.3–0.9)
MONOCYTES NFR BLD: 14 %
NEUTROPHILS # BLD: 8.7 THOUSAND/MCL (ref 1.8–7.7)
NEUTROPHILS NFR BLD: 74 %
NEUTS SEG NFR BLD: ABNORMAL %
PERCENT NRBC: ABNORMAL
PLATELET # BLD: 149 THOUSAND/MCL (ref 140–450)
POTASSIUM SERPL-SCNC: 4.7 MMOL/L (ref 3.4–5.1)
PROT SERPL-MCNC: 5.8 GM/DL (ref 6.4–8.2)
RBC # BLD: 2.88 MILLION/MCL (ref 4.5–5.9)
SODIUM SERPL-SCNC: 134 MMOL/L (ref 135–145)
WBC # BLD: 11.7 THOUSAND/MCL (ref 4.2–11)

## 2018-05-12 LAB
ANALYZER ANC (IANC): ABNORMAL
ANION GAP SERPL CALC-SCNC: 13 MMOL/L (ref 10–20)
BUN SERPL-MCNC: 24 MG/DL (ref 6–20)
BUN/CREAT SERPL: 25 (ref 7–25)
CALCIUM SERPL-MCNC: 8.3 MG/DL (ref 8.4–10.2)
CHLORIDE: 98 MMOL/L (ref 98–107)
CO2 SERPL-SCNC: 26 MMOL/L (ref 21–32)
CREAT SERPL-MCNC: 0.97 MG/DL (ref 0.67–1.17)
ERYTHROCYTE [DISTWIDTH] IN BLOOD: 12.7 % (ref 11–15)
GLUCOSE BLDC GLUCOMTR-MCNC: 88 MG/DL (ref 65–99)
GLUCOSE BLDC GLUCOMTR-MCNC: 94 MG/DL (ref 65–99)
GLUCOSE BLDC GLUCOMTR-MCNC: 94 MG/DL (ref 65–99)
GLUCOSE SERPL-MCNC: 97 MG/DL (ref 65–99)
HEMATOCRIT: 28.5 % (ref 39–51)
HGB BLD-MCNC: 9.6 GM/DL (ref 13–17)
MCH RBC QN AUTO: 31.4 PG (ref 26–34)
MCHC RBC AUTO-ENTMCNC: 33.7 GM/DL (ref 32–36.5)
MCV RBC AUTO: 93.1 FL (ref 78–100)
PERCENT NRBC: ABNORMAL
PLATELET # BLD: 197 THOUSAND/MCL (ref 140–450)
POTASSIUM SERPL-SCNC: 4.5 MMOL/L (ref 3.4–5.1)
RBC # BLD: 3.06 MILLION/MCL (ref 4.5–5.9)
SODIUM SERPL-SCNC: 132 MMOL/L (ref 135–145)
WBC # BLD: 9.5 THOUSAND/MCL (ref 4.2–11)

## 2018-05-13 ENCOUNTER — DIAGNOSTIC TRANS (OUTPATIENT)
Dept: OTHER | Age: 68
End: 2018-05-13

## 2018-05-13 LAB
ANALYZER ANC (IANC): ABNORMAL
ANION GAP SERPL CALC-SCNC: 13 MMOL/L (ref 10–20)
BASOPHILS # BLD: 0 THOUSAND/MCL (ref 0–0.3)
BASOPHILS NFR BLD: 0 %
BUN SERPL-MCNC: 27 MG/DL (ref 6–20)
BUN/CREAT SERPL: 25 (ref 7–25)
CALCIUM SERPL-MCNC: 8.3 MG/DL (ref 8.4–10.2)
CHLORIDE: 99 MMOL/L (ref 98–107)
CO2 SERPL-SCNC: 27 MMOL/L (ref 21–32)
CREAT SERPL-MCNC: 1.07 MG/DL (ref 0.67–1.17)
DIFFERENTIAL METHOD BLD: ABNORMAL
EOSINOPHIL # BLD: 0.5 THOUSAND/MCL (ref 0.1–0.5)
EOSINOPHIL NFR BLD: 6 %
ERYTHROCYTE [DISTWIDTH] IN BLOOD: 12.5 % (ref 11–15)
GLUCOSE BLDC GLUCOMTR-MCNC: 114 MG/DL (ref 65–99)
GLUCOSE SERPL-MCNC: 97 MG/DL (ref 65–99)
HEMATOCRIT: 27.1 % (ref 39–51)
HGB BLD-MCNC: 9.2 GM/DL (ref 13–17)
LYMPHOCYTES # BLD: 1 THOUSAND/MCL (ref 1–4)
LYMPHOCYTES NFR BLD: 11 %
MCH RBC QN AUTO: 31.3 PG (ref 26–34)
MCHC RBC AUTO-ENTMCNC: 33.9 GM/DL (ref 32–36.5)
MCV RBC AUTO: 92.2 FL (ref 78–100)
MONOCYTES # BLD: 1.7 THOUSAND/MCL (ref 0.3–0.9)
MONOCYTES NFR BLD: 19 %
NEUTROPHILS # BLD: 5.9 THOUSAND/MCL (ref 1.8–7.7)
NEUTROPHILS NFR BLD: 64 %
NEUTS SEG NFR BLD: ABNORMAL %
PERCENT NRBC: ABNORMAL
PLATELET # BLD: 243 THOUSAND/MCL (ref 140–450)
POTASSIUM SERPL-SCNC: 3.6 MMOL/L (ref 3.4–5.1)
RBC # BLD: 2.94 MILLION/MCL (ref 4.5–5.9)
SODIUM SERPL-SCNC: 135 MMOL/L (ref 135–145)
WBC # BLD: 9.2 THOUSAND/MCL (ref 4.2–11)

## 2018-05-14 LAB
ANALYZER ANC (IANC): ABNORMAL
ANION GAP SERPL CALC-SCNC: 12 MMOL/L (ref 10–20)
BASOPHILS # BLD: 0 THOUSAND/MCL (ref 0–0.3)
BASOPHILS NFR BLD: 0 %
BUN SERPL-MCNC: 18 MG/DL (ref 6–20)
BUN/CREAT SERPL: 20 (ref 7–25)
CALCIUM SERPL-MCNC: 8.5 MG/DL (ref 8.4–10.2)
CHLORIDE: 101 MMOL/L (ref 98–107)
CO2 SERPL-SCNC: 28 MMOL/L (ref 21–32)
CREAT SERPL-MCNC: 0.92 MG/DL (ref 0.67–1.17)
DIFFERENTIAL METHOD BLD: ABNORMAL
EOSINOPHIL # BLD: 0.6 THOUSAND/MCL (ref 0.1–0.5)
EOSINOPHIL NFR BLD: 7 %
ERYTHROCYTE [DISTWIDTH] IN BLOOD: 12.8 % (ref 11–15)
GLUCOSE BLDC GLUCOMTR-MCNC: 106 MG/DL (ref 65–99)
GLUCOSE BLDC GLUCOMTR-MCNC: 94 MG/DL (ref 65–99)
GLUCOSE SERPL-MCNC: 103 MG/DL (ref 65–99)
HEMATOCRIT: 28.2 % (ref 39–51)
HGB BLD-MCNC: 9.6 GM/DL (ref 13–17)
LYMPHOCYTES # BLD: 1.1 THOUSAND/MCL (ref 1–4)
LYMPHOCYTES NFR BLD: 12 %
MAGNESIUM SERPL-MCNC: 2.1 MG/DL (ref 1.7–2.4)
MCH RBC QN AUTO: 31.7 PG (ref 26–34)
MCHC RBC AUTO-ENTMCNC: 34 GM/DL (ref 32–36.5)
MCV RBC AUTO: 93.1 FL (ref 78–100)
MONOCYTES # BLD: 1.9 THOUSAND/MCL (ref 0.3–0.9)
MONOCYTES NFR BLD: 21 %
NEUTROPHILS # BLD: 5.6 THOUSAND/MCL (ref 1.8–7.7)
NEUTROPHILS NFR BLD: 60 %
NEUTS SEG NFR BLD: ABNORMAL %
PERCENT NRBC: ABNORMAL
PLATELET # BLD: 308 THOUSAND/MCL (ref 140–450)
POTASSIUM SERPL-SCNC: 4 MMOL/L (ref 3.4–5.1)
RBC # BLD: 3.03 MILLION/MCL (ref 4.5–5.9)
SODIUM SERPL-SCNC: 137 MMOL/L (ref 135–145)
WBC # BLD: 9.3 THOUSAND/MCL (ref 4.2–11)

## 2018-05-17 ENCOUNTER — LAB SERVICES (OUTPATIENT)
Dept: OTHER | Age: 68
End: 2018-05-17

## 2018-05-17 ENCOUNTER — CHARTING TRANS (OUTPATIENT)
Dept: OTHER | Age: 68
End: 2018-05-17

## 2018-05-17 ENCOUNTER — MYAURORA ACCOUNT LINK (OUTPATIENT)
Dept: OTHER | Age: 68
End: 2018-05-17

## 2018-05-17 ASSESSMENT — PAIN SCALES - GENERAL: PAINLEVEL_OUTOF10: 0

## 2018-05-18 ENCOUNTER — CHARTING TRANS (OUTPATIENT)
Dept: OTHER | Age: 68
End: 2018-05-18

## 2018-05-18 LAB
ANION GAP SERPL CALC-SCNC: 16 MMOL/L (ref 10–20)
BASOPHILS # BLD: 0.1 K/MCL (ref 0–0.3)
BASOPHILS NFR BLD: 1 %
BUN SERPL-MCNC: 12 MG/DL (ref 6–20)
BUN/CREAT SERPL: 13 (ref 7–25)
CALCIUM SERPL-MCNC: 8.8 MG/DL (ref 8.4–10.2)
CHLORIDE SERPL-SCNC: 103 MMOL/L (ref 98–107)
CO2 SERPL-SCNC: 25 MMOL/L (ref 21–32)
CREAT SERPL-MCNC: 0.91 MG/DL (ref 0.67–1.17)
DIFFERENTIAL METHOD BLD: ABNORMAL
EOSINOPHIL # BLD: 0.6 K/MCL (ref 0.1–0.5)
EOSINOPHIL NFR BLD: 6 %
ERYTHROCYTE [DISTWIDTH] IN BLOOD: 12.7 % (ref 11–15)
GLUCOSE SERPL-MCNC: 96 MG/DL (ref 65–99)
HEMATOCRIT: 35.4 % (ref 39–51)
HEMOGLOBIN: 11.1 G/DL (ref 13–17)
IMM GRANULOCYTES # BLD AUTO: 0.3 K/MCL (ref 0–0.2)
IMM GRANULOCYTES NFR BLD: 3 %
LENGTH OF FAST TIME PATIENT: NORMAL HRS
LYMPHOCYTES # BLD: 1.1 K/MCL (ref 1–4)
LYMPHOCYTES NFR BLD: 11 %
MEAN CORPUSCULAR HEMOGLOBIN: 30.8 PG (ref 26–34)
MEAN CORPUSCULAR HGB CONC: 31.4 G/DL (ref 32–36.5)
MEAN CORPUSCULAR VOLUME: 98.3 FL (ref 78–100)
MONOCYTES # BLD: 1 K/MCL (ref 0.3–0.9)
MONOCYTES NFR BLD: 11 %
NEUTROPHILS # BLD: 6.8 K/MCL (ref 1.8–7.7)
NEUTROPHILS NFR BLD: 68 %
NRBC (NRBCRE): 0 /100 WBC
PLATELET COUNT: 605 K/MCL (ref 140–450)
POTASSIUM SERPL-SCNC: 4.7 MMOL/L (ref 3.4–5.1)
RED CELL COUNT: 3.6 MIL/MCL (ref 4.5–5.9)
SODIUM SERPL-SCNC: 139 MMOL/L (ref 135–145)
WHITE BLOOD COUNT: 9.8 K/MCL (ref 4.2–11)

## 2018-06-08 ENCOUNTER — DIAGNOSTIC TRANS (OUTPATIENT)
Dept: OTHER | Age: 68
End: 2018-06-08

## 2018-06-08 ENCOUNTER — HOSPITAL (OUTPATIENT)
Dept: OTHER | Age: 68
End: 2018-06-08

## 2018-06-18 ENCOUNTER — HOSPITAL (OUTPATIENT)
Dept: OTHER | Age: 68
End: 2018-06-18
Attending: INTERNAL MEDICINE

## 2018-06-27 ENCOUNTER — HOSPITAL (OUTPATIENT)
Dept: OTHER | Age: 68
End: 2018-06-27
Attending: SURGERY

## 2018-06-27 LAB
ALBUMIN SERPL-MCNC: 3.2 GM/DL (ref 3.6–5.1)
ALBUMIN/GLOB SERPL: 1 {RATIO} (ref 1–2.4)
ALP SERPL-CCNC: 123 UNIT/L (ref 45–117)
ALT SERPL-CCNC: 25 UNIT/L
ANALYZER ANC (IANC): ABNORMAL
ANION GAP SERPL CALC-SCNC: 10 MMOL/L (ref 10–20)
AST SERPL-CCNC: 16 UNIT/L
BASOPHILS # BLD: 0 THOUSAND/MCL (ref 0–0.3)
BASOPHILS NFR BLD: 0 %
BILIRUB SERPL-MCNC: 0.3 MG/DL (ref 0.2–1)
BUN SERPL-MCNC: 12 MG/DL (ref 6–20)
BUN/CREAT SERPL: 12 (ref 7–25)
CALCIUM SERPL-MCNC: 8.5 MG/DL (ref 8.4–10.2)
CHLORIDE: 108 MMOL/L (ref 98–107)
CO2 SERPL-SCNC: 29 MMOL/L (ref 21–32)
CREAT SERPL-MCNC: 1.04 MG/DL (ref 0.67–1.17)
DIFFERENTIAL METHOD BLD: ABNORMAL
EOSINOPHIL # BLD: 0.6 THOUSAND/MCL (ref 0.1–0.5)
EOSINOPHIL NFR BLD: 5 %
ERYTHROCYTE [DISTWIDTH] IN BLOOD: 13.7 % (ref 11–15)
GLOBULIN SER-MCNC: 3.1 GM/DL (ref 2–4)
GLUCOSE BLDC GLUCOMTR-MCNC: 115 MG/DL (ref 65–99)
GLUCOSE BLDC GLUCOMTR-MCNC: 84 MG/DL (ref 65–99)
GLUCOSE BLDC GLUCOMTR-MCNC: 89 MG/DL (ref 65–99)
GLUCOSE BLDC GLUCOMTR-MCNC: 92 MG/DL (ref 65–99)
GLUCOSE SERPL-MCNC: 115 MG/DL (ref 65–99)
HEMATOCRIT: 37.1 % (ref 39–51)
HGB BLD-MCNC: 12.1 GM/DL (ref 13–17)
INR PPP: 1.1
LYMPHOCYTES # BLD: 3.4 THOUSAND/MCL (ref 1–4)
LYMPHOCYTES NFR BLD: 25 %
MCH RBC QN AUTO: 30.5 PG (ref 26–34)
MCHC RBC AUTO-ENTMCNC: 32.6 GM/DL (ref 32–36.5)
MCV RBC AUTO: 93.5 FL (ref 78–100)
MONOCYTES # BLD: 1.1 THOUSAND/MCL (ref 0.3–0.9)
MONOCYTES NFR BLD: 8 %
NEUTROPHILS # BLD: 8.4 THOUSAND/MCL (ref 1.8–7.7)
NEUTROPHILS NFR BLD: 62 %
NEUTS SEG NFR BLD: ABNORMAL %
NRBC (NRBCRE): ABNORMAL
PLATELET # BLD: 277 THOUSAND/MCL (ref 140–450)
POTASSIUM SERPL-SCNC: 3.9 MMOL/L (ref 3.4–5.1)
PROT SERPL-MCNC: 6.3 GM/DL (ref 6.4–8.2)
PROTHROMBIN TIME: 11.4 SECONDS (ref 9.7–11.8)
PROTHROMBIN TIME: NORMAL
RBC # BLD: 3.97 MILLION/MCL (ref 4.5–5.9)
SODIUM SERPL-SCNC: 143 MMOL/L (ref 135–145)
WBC # BLD: 13.6 THOUSAND/MCL (ref 4.2–11)

## 2018-06-28 ENCOUNTER — DIAGNOSTIC TRANS (OUTPATIENT)
Dept: OTHER | Age: 68
End: 2018-06-28

## 2018-06-28 ENCOUNTER — CHARTING TRANS (OUTPATIENT)
Dept: OTHER | Age: 68
End: 2018-06-28

## 2018-06-28 LAB
ANALYZER ANC (IANC): ABNORMAL
ANION GAP SERPL CALC-SCNC: 12 MMOL/L (ref 10–20)
BASOPHILS # BLD: 0 THOUSAND/MCL (ref 0–0.3)
BASOPHILS NFR BLD: 0 %
BUN SERPL-MCNC: 9 MG/DL (ref 6–20)
BUN/CREAT SERPL: 10 (ref 7–25)
CALCIUM SERPL-MCNC: 8.2 MG/DL (ref 8.4–10.2)
CHLORIDE: 106 MMOL/L (ref 98–107)
CO2 SERPL-SCNC: 27 MMOL/L (ref 21–32)
CREAT SERPL-MCNC: 0.88 MG/DL (ref 0.67–1.17)
DIFFERENTIAL METHOD BLD: ABNORMAL
EOSINOPHIL # BLD: 0.2 THOUSAND/MCL (ref 0.1–0.5)
EOSINOPHIL NFR BLD: 3 %
ERYTHROCYTE [DISTWIDTH] IN BLOOD: 13.9 % (ref 11–15)
GLUCOSE SERPL-MCNC: 131 MG/DL (ref 65–99)
HEMATOCRIT: 29.5 % (ref 39–51)
HGB BLD-MCNC: 9.6 GM/DL (ref 13–17)
LYMPHOCYTES # BLD: 1.6 THOUSAND/MCL (ref 1–4)
LYMPHOCYTES NFR BLD: 25 %
MAGNESIUM SERPL-MCNC: 1.6 MG/DL (ref 1.7–2.4)
MCH RBC QN AUTO: 30.5 PG (ref 26–34)
MCHC RBC AUTO-ENTMCNC: 32.5 GM/DL (ref 32–36.5)
MCV RBC AUTO: 93.7 FL (ref 78–100)
MONOCYTES # BLD: 0.7 THOUSAND/MCL (ref 0.3–0.9)
MONOCYTES NFR BLD: 11 %
NEUTROPHILS # BLD: 3.9 THOUSAND/MCL (ref 1.8–7.7)
NEUTROPHILS NFR BLD: 61 %
NEUTS SEG NFR BLD: ABNORMAL %
NRBC (NRBCRE): ABNORMAL
PLATELET # BLD: 280 THOUSAND/MCL (ref 140–450)
POTASSIUM SERPL-SCNC: 4 MMOL/L (ref 3.4–5.1)
RBC # BLD: 3.15 MILLION/MCL (ref 4.5–5.9)
SODIUM SERPL-SCNC: 141 MMOL/L (ref 135–145)
WBC # BLD: 6.4 THOUSAND/MCL (ref 4.2–11)

## 2018-07-02 ENCOUNTER — MYAURORA ACCOUNT LINK (OUTPATIENT)
Dept: OTHER | Age: 68
End: 2018-07-02

## 2018-07-02 ENCOUNTER — LAB SERVICES (OUTPATIENT)
Dept: OTHER | Age: 68
End: 2018-07-02

## 2018-07-02 ENCOUNTER — CHARTING TRANS (OUTPATIENT)
Dept: OTHER | Age: 68
End: 2018-07-02

## 2018-07-02 LAB
APPEARANCE: CLEAR
BILIRUBIN: NEGATIVE
COLOR: YELLOW
GLUCOSE U: NEGATIVE
KETONES: NEGATIVE
LEUKOCYTES: NEGATIVE
NITRITE: NEGATIVE
OCCULT BLOOD: NEGATIVE
PH: 8.5
PROTEIN: NEGATIVE
URINE SPEC GRAVITY: 1.02
UROBILINOGEN: 2

## 2018-08-15 ENCOUNTER — HOSPITAL (OUTPATIENT)
Dept: OTHER | Age: 68
End: 2018-08-15
Attending: INTERNAL MEDICINE

## 2018-09-01 ENCOUNTER — HOSPITAL (OUTPATIENT)
Dept: OTHER | Age: 68
End: 2018-09-01
Attending: INTERNAL MEDICINE

## 2018-10-01 ENCOUNTER — HOSPITAL (OUTPATIENT)
Dept: OTHER | Age: 68
End: 2018-10-01
Attending: INTERNAL MEDICINE

## 2018-10-31 VITALS
TEMPERATURE: 98.1 F | WEIGHT: 207 LBS | HEIGHT: 73 IN | RESPIRATION RATE: 16 BRPM | DIASTOLIC BLOOD PRESSURE: 68 MMHG | HEART RATE: 84 BPM | BODY MASS INDEX: 27.43 KG/M2 | SYSTOLIC BLOOD PRESSURE: 110 MMHG

## 2018-11-01 ENCOUNTER — HOSPITAL (OUTPATIENT)
Dept: OTHER | Age: 68
End: 2018-11-01
Attending: INTERNAL MEDICINE

## 2018-11-01 VITALS
DIASTOLIC BLOOD PRESSURE: 60 MMHG | SYSTOLIC BLOOD PRESSURE: 116 MMHG | HEART RATE: 74 BPM | HEIGHT: 73 IN | BODY MASS INDEX: 28.76 KG/M2 | WEIGHT: 217 LBS | RESPIRATION RATE: 14 BRPM

## 2018-11-05 VITALS
BODY MASS INDEX: 29.03 KG/M2 | WEIGHT: 219 LBS | HEIGHT: 73 IN | HEART RATE: 70 BPM | TEMPERATURE: 97.7 F | DIASTOLIC BLOOD PRESSURE: 88 MMHG | SYSTOLIC BLOOD PRESSURE: 132 MMHG

## 2018-12-27 ENCOUNTER — OFFICE VISIT (OUTPATIENT)
Dept: INTERNAL MEDICINE | Age: 68
End: 2018-12-27

## 2018-12-27 VITALS
BODY MASS INDEX: 28.44 KG/M2 | WEIGHT: 210 LBS | HEIGHT: 72 IN | OXYGEN SATURATION: 97 % | RESPIRATION RATE: 16 BRPM | HEART RATE: 67 BPM | SYSTOLIC BLOOD PRESSURE: 128 MMHG | TEMPERATURE: 98.4 F | DIASTOLIC BLOOD PRESSURE: 80 MMHG

## 2018-12-27 DIAGNOSIS — I25.83 CORONARY ARTERY DISEASE DUE TO LIPID RICH PLAQUE: Primary | ICD-10-CM

## 2018-12-27 DIAGNOSIS — I25.10 CORONARY ARTERY DISEASE DUE TO LIPID RICH PLAQUE: Primary | ICD-10-CM

## 2018-12-27 PROBLEM — Z95.1 STATUS POST AORTO-CORONARY ARTERY BYPASS GRAFT: Status: ACTIVE | Noted: 2018-12-27

## 2018-12-27 PROCEDURE — 99213 OFFICE O/P EST LOW 20 MIN: CPT | Performed by: INTERNAL MEDICINE

## 2018-12-27 RX ORDER — OMEPRAZOLE 20 MG/1
20 CAPSULE, DELAYED RELEASE ORAL DAILY
COMMUNITY

## 2018-12-27 RX ORDER — METOPROLOL SUCCINATE 25 MG/1
25 TABLET, EXTENDED RELEASE ORAL DAILY
COMMUNITY
End: 2020-11-23 | Stop reason: ALTCHOICE

## 2018-12-27 RX ORDER — SILDENAFIL 100 MG/1
100 TABLET, FILM COATED ORAL PRN
COMMUNITY
End: 2020-02-19 | Stop reason: SDUPTHER

## 2018-12-27 RX ORDER — ATORVASTATIN CALCIUM 40 MG/1
40 TABLET, FILM COATED ORAL DAILY
COMMUNITY

## 2018-12-27 SDOH — HEALTH STABILITY: MENTAL HEALTH: HOW OFTEN DO YOU HAVE A DRINK CONTAINING ALCOHOL?: NEVER

## 2018-12-27 ASSESSMENT — PATIENT HEALTH QUESTIONNAIRE - PHQ9
SUM OF ALL RESPONSES TO PHQ9 QUESTIONS 1 AND 2: 0
2. FEELING DOWN, DEPRESSED OR HOPELESS: NOT AT ALL
1. LITTLE INTEREST OR PLEASURE IN DOING THINGS: NOT AT ALL

## 2019-05-06 ENCOUNTER — DIAGNOSTIC TRANS (OUTPATIENT)
Dept: OTHER | Age: 69
End: 2019-05-06

## 2019-05-06 ENCOUNTER — HOSPITAL (OUTPATIENT)
Dept: OTHER | Age: 69
End: 2019-05-06
Attending: INTERNAL MEDICINE

## 2019-06-07 ENCOUNTER — HOSPITAL (OUTPATIENT)
Dept: OTHER | Age: 69
End: 2019-06-07
Attending: INTERNAL MEDICINE

## 2019-06-07 LAB
ALT SERPL-CCNC: 34 UNITS/L
ANALYZER ANC (IANC): NORMAL
AST SERPL-CCNC: 28 UNITS/L
CHOLEST SERPL-MCNC: 118 MG/DL
CHOLEST SERPL-MCNC: NORMAL MG/DL
CHOLEST/HDLC SERPL: 2.9 {RATIO}
CK SERPL-CCNC: 353 UNITS/L (ref 39–308)
ERYTHROCYTE [DISTWIDTH] IN BLOOD: 12.8 % (ref 11–15)
HCT VFR BLD CALC: 45.8 % (ref 39–51)
HDLC SERPL-MCNC: 41 MG/DL
HDLC SERPL-MCNC: NORMAL MG/DL
HGB BLD-MCNC: 15.4 G/DL (ref 13–17)
LDLC SERPL CALC-MCNC: 64 MG/DL
LDLC SERPL CALC-MCNC: NORMAL MG/DL
LENGTH OF FAST TIME PATIENT: 12 HRS
MCH RBC QN AUTO: 31.5 PG (ref 26–34)
MCHC RBC AUTO-ENTMCNC: 33.6 G/DL (ref 32–36.5)
MCV RBC AUTO: 93.7 FL (ref 78–100)
NONHDLC SERPL-MCNC: 77 MG/DL
NONHDLC SERPL-MCNC: NORMAL MG/DL
NRBC (NRBCRE): 0 /100 WBC
PLATELET # BLD: 246 K/MCL (ref 140–450)
RBC # BLD: 4.89 MIL/MCL (ref 4.5–5.9)
TRIGL SERPL-MCNC: 65 MG/DL
TRIGL SERPL-MCNC: NORMAL MG/DL
WBC # BLD: 6.9 K/MCL (ref 4.2–11)

## 2019-07-20 ENCOUNTER — WALK IN (OUTPATIENT)
Dept: URGENT CARE | Age: 69
End: 2019-07-20

## 2019-07-20 ENCOUNTER — TELEPHONE (OUTPATIENT)
Dept: SCHEDULING | Age: 69
End: 2019-07-20

## 2019-07-20 VITALS
RESPIRATION RATE: 14 BRPM | HEART RATE: 62 BPM | DIASTOLIC BLOOD PRESSURE: 60 MMHG | OXYGEN SATURATION: 96 % | SYSTOLIC BLOOD PRESSURE: 120 MMHG | TEMPERATURE: 97.8 F

## 2019-07-20 DIAGNOSIS — R23.3 PETECHIAE: Primary | ICD-10-CM

## 2019-07-20 PROCEDURE — 99213 OFFICE O/P EST LOW 20 MIN: CPT | Performed by: NURSE PRACTITIONER

## 2019-07-20 SDOH — HEALTH STABILITY: MENTAL HEALTH: HOW OFTEN DO YOU HAVE A DRINK CONTAINING ALCOHOL?: NEVER

## 2019-07-20 ASSESSMENT — ENCOUNTER SYMPTOMS
FEVER: 0
RESPIRATORY NEGATIVE: 1
CHILLS: 0
HEADACHES: 0
EYES NEGATIVE: 1
ADENOPATHY: 0

## 2020-01-01 ENCOUNTER — EXTERNAL RECORD (OUTPATIENT)
Dept: HEALTH INFORMATION MANAGEMENT | Facility: OTHER | Age: 70
End: 2020-01-01

## 2020-02-19 DIAGNOSIS — N52.9 ERECTILE DYSFUNCTION, UNSPECIFIED ERECTILE DYSFUNCTION TYPE: Primary | ICD-10-CM

## 2020-02-19 RX ORDER — SILDENAFIL 100 MG/1
100 TABLET, FILM COATED ORAL PRN
Qty: 6 TABLET | Refills: 3 | Status: SHIPPED | OUTPATIENT
Start: 2020-02-19

## 2020-02-27 ENCOUNTER — TELEPHONE (OUTPATIENT)
Dept: INTERNAL MEDICINE | Age: 70
End: 2020-02-27

## 2020-02-27 ENCOUNTER — OFFICE VISIT (OUTPATIENT)
Dept: INTERNAL MEDICINE | Age: 70
End: 2020-02-27

## 2020-02-27 VITALS
WEIGHT: 224.98 LBS | HEIGHT: 72 IN | TEMPERATURE: 97.7 F | DIASTOLIC BLOOD PRESSURE: 61 MMHG | BODY MASS INDEX: 30.47 KG/M2 | HEART RATE: 50 BPM | SYSTOLIC BLOOD PRESSURE: 111 MMHG | OXYGEN SATURATION: 96 %

## 2020-02-27 DIAGNOSIS — J20.9 ACUTE BRONCHITIS, UNSPECIFIED ORGANISM: ICD-10-CM

## 2020-02-27 DIAGNOSIS — J01.90 ACUTE NON-RECURRENT SINUSITIS, UNSPECIFIED LOCATION: Primary | ICD-10-CM

## 2020-02-27 DIAGNOSIS — I25.10 CORONARY ARTERY DISEASE INVOLVING NATIVE CORONARY ARTERY OF NATIVE HEART WITHOUT ANGINA PECTORIS: ICD-10-CM

## 2020-02-27 PROBLEM — K21.9 GERD (GASTROESOPHAGEAL REFLUX DISEASE): Status: ACTIVE | Noted: 2020-02-27

## 2020-02-27 PROBLEM — R55 VASOVAGAL SYNCOPE: Status: ACTIVE | Noted: 2018-07-02

## 2020-02-27 PROBLEM — D64.9 ANEMIA: Status: ACTIVE | Noted: 2018-05-17

## 2020-02-27 PROCEDURE — 99214 OFFICE O/P EST MOD 30 MIN: CPT | Performed by: INTERNAL MEDICINE

## 2020-02-27 RX ORDER — AMOXICILLIN AND CLAVULANATE POTASSIUM 875; 125 MG/1; MG/1
TABLET, FILM COATED ORAL
Qty: 20 TABLET | Refills: 0 | Status: SHIPPED | OUTPATIENT
Start: 2020-02-27 | End: 2020-03-17 | Stop reason: ALTCHOICE

## 2020-02-27 ASSESSMENT — ENCOUNTER SYMPTOMS
SHORTNESS OF BREATH: 0
SINUS PAIN: 0
CHEST TIGHTNESS: 1
COUGH: 1
DIZZINESS: 0
TROUBLE SWALLOWING: 0
CHILLS: 0
SORE THROAT: 0
FEVER: 0
SINUS PRESSURE: 0
DIARRHEA: 0
HEADACHES: 0
VOICE CHANGE: 0
WHEEZING: 0
RHINORRHEA: 0
FATIGUE: 0
ADENOPATHY: 0
CONSTIPATION: 0

## 2020-02-27 ASSESSMENT — PATIENT HEALTH QUESTIONNAIRE - PHQ9
SUM OF ALL RESPONSES TO PHQ9 QUESTIONS 1 AND 2: 0
2. FEELING DOWN, DEPRESSED OR HOPELESS: NOT AT ALL
1. LITTLE INTEREST OR PLEASURE IN DOING THINGS: NOT AT ALL
SUM OF ALL RESPONSES TO PHQ9 QUESTIONS 1 AND 2: 0

## 2020-03-05 ENCOUNTER — TELEPHONE (OUTPATIENT)
Dept: INTERNAL MEDICINE | Age: 70
End: 2020-03-05

## 2020-03-17 ENCOUNTER — HOSPITAL ENCOUNTER (OUTPATIENT)
Dept: GENERAL RADIOLOGY | Age: 70
Discharge: HOME | End: 2020-03-17

## 2020-03-17 ENCOUNTER — TELEPHONE (OUTPATIENT)
Dept: INTERNAL MEDICINE | Age: 70
End: 2020-03-17

## 2020-03-17 ENCOUNTER — OFFICE VISIT (OUTPATIENT)
Dept: INTERNAL MEDICINE | Age: 70
End: 2020-03-17

## 2020-03-17 DIAGNOSIS — R07.9 CHEST PAIN, UNSPECIFIED TYPE: ICD-10-CM

## 2020-03-17 DIAGNOSIS — R07.9 CHEST PAIN, UNSPECIFIED TYPE: Primary | ICD-10-CM

## 2020-03-17 PROCEDURE — 99214 OFFICE O/P EST MOD 30 MIN: CPT | Performed by: INTERNAL MEDICINE

## 2020-03-17 PROCEDURE — 71046 X-RAY EXAM CHEST 2 VIEWS: CPT

## 2020-03-17 SDOH — HEALTH STABILITY: PHYSICAL HEALTH: ON AVERAGE, HOW MANY MINUTES DO YOU ENGAGE IN EXERCISE AT THIS LEVEL?: 60 MIN

## 2020-03-17 SDOH — HEALTH STABILITY: PHYSICAL HEALTH: ON AVERAGE, HOW MANY DAYS PER WEEK DO YOU ENGAGE IN MODERATE TO STRENUOUS EXERCISE (LIKE A BRISK WALK)?: 5 DAYS

## 2020-03-17 SDOH — HEALTH STABILITY: MENTAL HEALTH
STRESS IS WHEN SOMEONE FEELS TENSE, NERVOUS, ANXIOUS, OR CAN'T SLEEP AT NIGHT BECAUSE THEIR MIND IS TROUBLED. HOW STRESSED ARE YOU?: NOT AT ALL

## 2020-03-17 SDOH — HEALTH STABILITY: MENTAL HEALTH: HOW OFTEN DO YOU HAVE A DRINK CONTAINING ALCOHOL?: NEVER

## 2020-03-17 ASSESSMENT — PATIENT HEALTH QUESTIONNAIRE - PHQ9
SUM OF ALL RESPONSES TO PHQ9 QUESTIONS 1 AND 2: 0
SUM OF ALL RESPONSES TO PHQ9 QUESTIONS 1 AND 2: 0
2. FEELING DOWN, DEPRESSED OR HOPELESS: NOT AT ALL
1. LITTLE INTEREST OR PLEASURE IN DOING THINGS: NOT AT ALL

## 2020-03-17 ASSESSMENT — PAIN SCALES - GENERAL: PAINLEVEL: 1-2

## 2020-09-21 ENCOUNTER — APPOINTMENT (EMERGENCY)
Dept: RADIOLOGY | Facility: HOSPITAL | Age: 70
End: 2020-09-21
Payer: MEDICARE

## 2020-09-21 ENCOUNTER — HOSPITAL ENCOUNTER (EMERGENCY)
Facility: HOSPITAL | Age: 70
Discharge: HOME | End: 2020-09-21
Attending: EMERGENCY MEDICINE
Payer: MEDICARE

## 2020-09-21 VITALS
HEART RATE: 77 BPM | HEIGHT: 73 IN | WEIGHT: 214.95 LBS | TEMPERATURE: 97.9 F | DIASTOLIC BLOOD PRESSURE: 73 MMHG | RESPIRATION RATE: 18 BRPM | BODY MASS INDEX: 28.49 KG/M2 | SYSTOLIC BLOOD PRESSURE: 129 MMHG | OXYGEN SATURATION: 97 %

## 2020-09-21 DIAGNOSIS — R07.81 RIB PAIN ON LEFT SIDE: Primary | ICD-10-CM

## 2020-09-21 PROCEDURE — 99282 EMERGENCY DEPT VISIT SF MDM: CPT | Performed by: EMERGENCY MEDICINE

## 2020-09-21 PROCEDURE — 99284 EMERGENCY DEPT VISIT MOD MDM: CPT

## 2020-09-21 PROCEDURE — 71101 X-RAY EXAM UNILAT RIBS/CHEST: CPT

## 2020-09-21 RX ORDER — ATORVASTATIN CALCIUM 10 MG/1
10 TABLET, FILM COATED ORAL DAILY
COMMUNITY

## 2020-09-21 RX ORDER — IBUPROFEN 400 MG/1
400 TABLET ORAL ONCE
Status: COMPLETED | OUTPATIENT
Start: 2020-09-21 | End: 2020-09-21

## 2020-09-21 RX ADMIN — IBUPROFEN 400 MG: 400 TABLET ORAL at 18:37
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
## 2020-09-21 NOTE — DISCHARGE INSTRUCTIONS
Please continue to use your inceptive spirometry at home  Please continue to take ibuprofen for pain as needed  Do not exceed 2,400 mg per day
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
## 2020-11-23 ENCOUNTER — OFFICE VISIT (OUTPATIENT)
Dept: INTERNAL MEDICINE | Age: 70
End: 2020-11-23

## 2020-11-23 DIAGNOSIS — Z23 NEED FOR PNEUMOCOCCAL VACCINATION: ICD-10-CM

## 2020-11-23 DIAGNOSIS — Z12.11 COLON CANCER SCREENING: ICD-10-CM

## 2020-11-23 DIAGNOSIS — Z00.00 MEDICARE ANNUAL WELLNESS VISIT, INITIAL: Primary | ICD-10-CM

## 2020-11-23 DIAGNOSIS — R00.1 BRADYCARDIA: ICD-10-CM

## 2020-11-23 DIAGNOSIS — G47.9 SLEEP DISTURBANCES: ICD-10-CM

## 2020-11-23 DIAGNOSIS — E78.5 MILD HYPERLIPIDEMIA: ICD-10-CM

## 2020-11-23 DIAGNOSIS — K21.9 GASTROESOPHAGEAL REFLUX DISEASE WITHOUT ESOPHAGITIS: ICD-10-CM

## 2020-11-23 DIAGNOSIS — B35.1 TOENAIL FUNGUS: ICD-10-CM

## 2020-11-23 DIAGNOSIS — Z11.59 NEED FOR HEPATITIS C SCREENING TEST: ICD-10-CM

## 2020-11-23 DIAGNOSIS — Z13.29 THYROID DISORDER SCREEN: ICD-10-CM

## 2020-11-23 PROCEDURE — G0009 ADMIN PNEUMOCOCCAL VACCINE: HCPCS

## 2020-11-23 PROCEDURE — 99214 OFFICE O/P EST MOD 30 MIN: CPT | Performed by: INTERNAL MEDICINE

## 2020-11-23 PROCEDURE — 36415 COLL VENOUS BLD VENIPUNCTURE: CPT | Performed by: INTERNAL MEDICINE

## 2020-11-23 PROCEDURE — G0438 PPPS, INITIAL VISIT: HCPCS | Performed by: INTERNAL MEDICINE

## 2020-11-23 PROCEDURE — 90732 PPSV23 VACC 2 YRS+ SUBQ/IM: CPT

## 2020-11-23 SDOH — ECONOMIC STABILITY: INCOME INSECURITY: HOW HARD IS IT FOR YOU TO PAY FOR THE VERY BASICS LIKE FOOD, HOUSING, MEDICAL CARE, AND HEATING?: NOT HARD AT ALL

## 2020-11-23 SDOH — SOCIAL STABILITY: SOCIAL NETWORK: ARE YOU MARRIED, WIDOWED, DIVORCED, SEPARATED, NEVER MARRIED, OR LIVING WITH A PARTNER?: DIVORCED

## 2020-11-23 SDOH — SOCIAL STABILITY: SOCIAL INSECURITY: WITHIN THE LAST YEAR, HAVE YOU BEEN HUMILIATED OR EMOTIONALLY ABUSED IN OTHER WAYS BY YOUR PARTNER OR EX-PARTNER?: NO

## 2020-11-23 SDOH — ECONOMIC STABILITY: TRANSPORTATION INSECURITY
IN THE PAST 12 MONTHS, HAS THE LACK OF TRANSPORTATION KEPT YOU FROM MEDICAL APPOINTMENTS OR FROM GETTING MEDICATIONS?: NO

## 2020-11-23 SDOH — SOCIAL STABILITY: SOCIAL NETWORK
IN A TYPICAL WEEK, HOW MANY TIMES DO YOU TALK ON THE PHONE WITH FAMILY, FRIENDS, OR NEIGHBORS?: MORE THAN THREE TIMES A WEEK

## 2020-11-23 SDOH — ECONOMIC STABILITY: HOUSING INSECURITY: ARE YOU WORRIED ABOUT LOSING YOUR HOUSING?: NO

## 2020-11-23 SDOH — ECONOMIC STABILITY: TRANSPORTATION INSECURITY
IN THE PAST 12 MONTHS, HAS LACK OF TRANSPORTATION KEPT YOU FROM MEETINGS, WORK, OR FROM GETTING THINGS NEEDED FOR DAILY LIVING?: NO

## 2020-11-23 SDOH — SOCIAL STABILITY: SOCIAL INSECURITY
WITHIN THE LAST YEAR, HAVE TO BEEN RAPED OR FORCED TO HAVE ANY KIND OF SEXUAL ACTIVITY BY YOUR PARTNER OR EX-PARTNER?: NO

## 2020-11-23 SDOH — SOCIAL STABILITY: SOCIAL NETWORK
DO YOU BELONG TO ANY CLUBS OR ORGANIZATIONS SUCH AS CHURCH GROUPS UNIONS, FRATERNAL OR ATHLETIC GROUPS, OR SCHOOL GROUPS?: YES

## 2020-11-23 SDOH — SOCIAL STABILITY: SOCIAL NETWORK: HOW OFTEN DO YOU ATTENT MEETINGS OF THE CLUB OR ORGANIZATION YOU BELONG TO?: MORE THAN 4 TIMES PER YEAR

## 2020-11-23 SDOH — ECONOMIC STABILITY: FOOD INSECURITY: WITHIN THE PAST 12 MONTHS, YOU WORRIED THAT YOUR FOOD WOULD RUN OUT BEFORE YOU GOT MONEY TO BUY MORE.: NEVER TRUE

## 2020-11-23 SDOH — SOCIAL STABILITY: SOCIAL INSECURITY: WITHIN THE LAST YEAR, HAVE YOU BEEN AFRAID OF YOUR PARTNER OR EX-PARTNER?: NO

## 2020-11-23 SDOH — ECONOMIC STABILITY: FOOD INSECURITY: WITHIN THE PAST 12 MONTHS, THE FOOD YOU BOUGHT JUST DIDN'T LAST AND YOU DIDN'T HAVE MONEY TO GET MORE.: NEVER TRUE

## 2020-11-23 SDOH — SOCIAL STABILITY: SOCIAL NETWORK: HOW OFTEN DO YOU GET TOGETHER WITH FRIENDS OR RELATIVES?: ONCE A WEEK

## 2020-11-23 SDOH — SOCIAL STABILITY: SOCIAL INSECURITY
WITHIN THE LAST YEAR, HAVE YOU BEEN KICKED, HIT, SLAPPED, OR OTHERWISE PHYSICALLY HURT BY YOUR PARTNER OR EX-PARTNER?: NO

## 2020-11-23 SDOH — SOCIAL STABILITY: SOCIAL NETWORK: HOW OFTEN DO YOU ATTEND CHURCH OR RELIGIOUS SERVICES?: MORE THAN 4 TIMES PER YEAR

## 2020-11-23 SDOH — ECONOMIC STABILITY: HOUSING INSECURITY: WHAT IS YOUR LIVING SITUATION TODAY?: I HAVE HOUSING

## 2020-11-23 SDOH — HEALTH STABILITY: PHYSICAL HEALTH: ON AVERAGE, HOW MANY DAYS PER WEEK DO YOU ENGAGE IN MODERATE TO STRENUOUS EXERCISE (LIKE A BRISK WALK)?: 7 DAYS

## 2020-11-23 SDOH — HEALTH STABILITY: PHYSICAL HEALTH: ON AVERAGE, HOW MANY MINUTES DO YOU ENGAGE IN EXERCISE AT THIS LEVEL?: 90 MIN

## 2020-11-23 ASSESSMENT — PATIENT HEALTH QUESTIONNAIRE - PHQ9
CLINICAL INTERPRETATION OF PHQ9 SCORE: NO FURTHER SCREENING NEEDED
1. LITTLE INTEREST OR PLEASURE IN DOING THINGS: NOT AT ALL
CLINICAL INTERPRETATION OF PHQ2 SCORE: NO FURTHER SCREENING NEEDED
SUM OF ALL RESPONSES TO PHQ9 QUESTIONS 1 AND 2: 1
2. FEELING DOWN, DEPRESSED OR HOPELESS: SEVERAL DAYS
SUM OF ALL RESPONSES TO PHQ9 QUESTIONS 1 AND 2: 1

## 2020-11-23 ASSESSMENT — ACTIVITIES OF DAILY LIVING (ADL)
PILL BOX USED: YES
EATING: INDEPENDENT
NEEDS_ASSIST: NO
ADL_BEFORE_ADMISSION: INDEPENDENT
TAKING MEDICATION: INDEPENDENT
USING TRANSPORTATION: INDEPENDENT
SENSORY_SUPPORT_DEVICES: EYEGLASSES
USING TELEPHONE: INDEPENDENT
NEEDS ASSISTANCE WITH FOOD: INDEPENDENT
DOING HOUSEWORK: INDEPENDENT
ADL_SHORT_OF_BREATH: NO
RECENT_DECLINE_ADL: NO
STIL DRIVING: YES
ADL_SCORE: 12
PREPARING MEALS: INDEPENDENT
MANAGING FINANCES: INDEPENDENT
GROCERY SHOPPING: INDEPENDENT

## 2020-11-23 ASSESSMENT — ENCOUNTER SYMPTOMS
AGITATION: 0
NAUSEA: 0
CHEST TIGHTNESS: 0
CHOKING: 0
ABDOMINAL PAIN: 0
CHILLS: 0
ACTIVITY CHANGE: 0
SHORTNESS OF BREATH: 0
APNEA: 0
APPETITE CHANGE: 0
BACK PAIN: 0
SPEECH DIFFICULTY: 0
SLEEP DISTURBANCE: 0
VOMITING: 0
CONSTIPATION: 0
FACIAL SWELLING: 0
WOUND: 0
DIARRHEA: 0
UNEXPECTED WEIGHT CHANGE: 0
CONFUSION: 0
NERVOUS/ANXIOUS: 0
ADENOPATHY: 0
DIZZINESS: 0
ABDOMINAL DISTENTION: 0
POLYDIPSIA: 0
WEAKNESS: 0
VOICE CHANGE: 0
POLYPHAGIA: 0
LIGHT-HEADEDNESS: 0
HALLUCINATIONS: 0
COLOR CHANGE: 0
FATIGUE: 0
COUGH: 0
WHEEZING: 0
BRUISES/BLEEDS EASILY: 0
SORE THROAT: 0
SEIZURES: 0
PHOTOPHOBIA: 0
DIAPHORESIS: 0
SINUS PRESSURE: 0
HEADACHES: 0

## 2020-11-23 ASSESSMENT — ANXIETY QUESTIONNAIRES
3. WORRYING TOO MUCH ABOUT DIFFERENT THINGS: 0
5. BEING SO RESTLESS THAT IT IS HARD TO SIT STILL: 0
4. TROUBLE RELAXING: NOT AT ALL
6. BECOMING EASILY ANNOYED OR IRRITABLE: NOT AT ALL
IF YOU CHECKED OFF ANY PROBLEMS ON THIS QUESTIONNAIRE, HOW DIFFICULT HAVE THESE PROBLEMS MADE IT FOR YOU TO DO YOUR WORK, TAKE CARE OF THINGS AT HOME, OR GET ALONG WITH OTHER PEOPLE: NOT DIFFICULT AT ALL
2. NOT BEING ABLE TO STOP OR CONTROL WORRYING: NOT AT ALL
1. FEELING NERVOUS, ANXIOUS, OR ON EDGE: 0
1. FEELING NERVOUS, ANXIOUS, OR ON EDGE: NOT AT ALL
GAD7 TOTAL SCORE: 0
7. FEELING AFRAID AS IF SOMETHING AWFUL MIGHT HAPPEN: 0
4. TROUBLE RELAXING: 0
6. BECOMING EASILY ANNOYED OR IRRITABLE: 0
7. FEELING AFRAID AS IF SOMETHING AWFUL MIGHT HAPPEN: NOT AT ALL
3. WORRYING TOO MUCH ABOUT DIFFERENT THINGS: NOT AT ALL
2. NOT BEING ABLE TO STOP OR CONTROL WORRYING: 0
5. BEING SO RESTLESS THAT IT IS HARD TO SIT STILL: NOT AT ALL

## 2020-11-23 ASSESSMENT — GERIATRIC DEPRESSION SCALE SHORT VERSION (GDS-SV)
ARE YOU IN GOOD SPIRITS MOST OF THE TIME: YES
DO YOU PREFER TO STAY AT HOME, RATHER THAN GOING OUT AND DOING NEW THINGS: NO
DO YOU FEEL HAPPY MOST OF THE TIME: YES
ARE YOU BASICALLY SATISFIED WITH YOUR LIFE: YES
DO YOU THINK IT IS WONDERFUL TO BE ALIVE NOW: YES
DO YOU FEEL THAT YOUR LIFE IS EMPTY: NO
DO YOU FEEL YOU HAVE MORE PROBLEMS WITH MEMORY THAN MOST: NO
DO YOU FIND LIFE VERY EXCITING: YES
DO YOU FEEL PRETTY WORTHLESS THE WAY YOU ARE NOW: NO
DO YOU OFTEN FEEL HELPLESS: NO
ARE YOU AFRAID THAT SOMETHING BAD IS GOING TO HAPPEN TO YOU: NO
IS IT HARD FOR YOU TO GET STARTED ON NEW PROJECTS?: NO
DO YOU FEEL FULL OF ENERGY: NO
HAVE YOU DROPPED MANY OF YOUR ACTIVITIES AND INTERESTS?: NO
GDS TOTAL SCORE: 1
DO YOU OFTEN GET BORED: NO

## 2020-11-23 ASSESSMENT — FRAILTY ASSESSMENTS
HAVE YOU FELT DOWN DURING THE LAST MONTH: NO
DO YOU EXPERIENCE PROBLEMS IN YOUR DAILY LIFE DUE TO POOR VISION: NO
DO YOU EXPERIENCE PROBLEMS IN YOUR DAILY LIFE DUE TO POOR HEARING: NO
DO YOU EXPERIENCE PROBLEMS IN YOUR DAILY LIFE DUE TO DIFFICULTY WALKING: NO
DO YOU HAVE PROBLEMS WITH YOUR MEMORY: NO
DO YOU FEEL PHYSICALLY HEALTHY: YES
DO YOU LIVE ALONE: NO
HIGHEST LEVEL OF EDUCATION COMPLETED: OTHER
DO YOU HAVE TWO OR MORE DISEASES AND/OR CHRONIC DISORDERS: YES
WHAT IS YOUR AGE: 68
TILBURG FRAILTY INDICATOR TOTAL SCORE: 2
OVERALL, HOW HEALTHY WOULD YOU SAY YOUR LIFESTYLE IS: HEALTHY
WHAT IS YOUR SEX: MALE
DO YOU EXPERIENCE PROBLEMS IN YOUR DAILY LIFE DUE TO LACK OF STRENGTH IN HANDS: NO
HAVE YOU EXPERIENCED ONE OR MORE OF THE FOLLOWING EVENTS DURING THE PAST YEAR: HAVE NOT HAD ANY INDICATED EVENTS IN THE PAST YEAR
HAVE YOU LOST A LOT OF WEIGHT RECENTLY WITHOUT WISHING TO - 6KG OR MORE DURING LAST SIX MONTHS OR 3KG IN LAST MONTH: NO
DO YOU EXPERIENCE PROBLEMS IN YOUR DAILY LIFE DUE TO DIFFICULTY MAINTAINING BALANCE: NO
WHAT IS YOUR MARITAL STATUS: SEPARATED/DIVORCED
DO YOU EXPERIENCE PROBLEMS IN YOUR DAILY LIFE DUE TO PHYSICAL TIREDNESS: NO
DO YOU SOMETIMES MISS HAVING PEOPLE AROUND YOU: YES
ARE YOU SATISFIED WITH YOUR HOME LIVING ENVIRONMENT: YES
HAVE YOU FELT NERVOUS OR ANXIOUS DURING THE LAST MONTH: YES
ARE YOU ABLE TO COPE WITH PROBLEMS WELL: YES
DO YOU RECEIVE ENOUGH SUPORT FROM OTHER PEOPLE: YES

## 2020-11-23 ASSESSMENT — MINI COG
PATIENT WAS GIVEN REPEAT BACK WORDS FROM VERSION: IMMEDIATE REPEAT BACK - APPLE WATCH PENNY
TOTAL SCORE: 3  NEGATIVE FOR COGNITIVE IMPAIRMENT (NO NEED TO SCORE CDT)
ABLE TO REPEAT THE 3 WORDS GIVEN PREVIOUSLY?: APPLE;WATCH;PENNY
PATIENT ABLE TO FILL IN THE CLOCK FACE WITH 10 MINUTES PAST 11 O'CLOCK?: YES, CLOCK IS CORRECT

## 2020-11-23 ASSESSMENT — GERIATRIC MINI NUTRITIONAL ASSESSMENT (MNA)
A HAS FOOD INTAKE DECLINED OVER THE PAST 3 MONTHS DUE TO LOSS OF APPETITE, DIGESTIVE PROBLEMS, CHEWING OR SWALLOWING DIFFICULTIES?: NO DECREASE IN FOOD INTAKE
B WEIGHT LOSS DURING THE LAST 3 MONTHS: WEIGHT LOSS GREATER THAN 3KG (6.6LBS)
C GENERAL MOBILITY: GOES OUT
E NEUROPSYCHOLOGICAL PROBLEMS: NO PSYCHOLOGICAL PROBLEMS
D HAS SUFFERED PSYCHOLOGICAL STRESS OR ACUTE DISEASE IN THE PAST 3 MONTHS?: NO

## 2020-11-23 ASSESSMENT — PAIN SCALES - PAIN ENJOYMENT GENERAL ACTIVITY SCALE (PEG)
INTERFERED_ENJOYMENT_LIFE: 0 (DOES NOT INTERFERE)
INTERFERED_GENERAL_ACTIVITY: 0 (DOES NOT INTERFERE)
PEG_TOTALSCORE: 0
AVG_PAIN_PASTWEEK: 1

## 2020-11-23 ASSESSMENT — COGNITIVE AND FUNCTIONAL STATUS - GENERAL
BECAUSE OF A PHYSICAL, MENTAL, OR EMOTIONAL CONDITION, DO YOU HAVE SERIOUS DIFFICULTY CONCENTRATING, REMEMBERING OR MAKING DECISIONS: NO
BECAUSE OF A PHYSICAL, MENTAL, OR EMOTIONAL CONDITION, DO YOU HAVE DIFFICULTY DOING ERRANDS ALONE: NO
DO YOU HAVE DIFFICULTY DRESSING OR BATHING: NO
DO YOU HAVE SERIOUS DIFFICULTY WALKING OR CLIMBING STAIRS: NO

## 2020-11-23 ASSESSMENT — PAIN SCALES - GENERAL: PAINLEVEL: 0

## 2020-11-24 ENCOUNTER — TELEPHONE (OUTPATIENT)
Dept: INTERNAL MEDICINE | Age: 70
End: 2020-11-24

## 2020-11-24 LAB
ALBUMIN SERPL-MCNC: 4.1 G/DL (ref 3.6–5.1)
ALBUMIN/GLOB SERPL: 1.4 {RATIO} (ref 1–2.4)
ALP SERPL-CCNC: 133 UNITS/L (ref 45–117)
ALT SERPL-CCNC: 31 UNITS/L
ANION GAP SERPL CALC-SCNC: 13 MMOL/L (ref 10–20)
AST SERPL-CCNC: 30 UNITS/L
BILIRUB SERPL-MCNC: 1 MG/DL (ref 0.2–1)
BUN SERPL-MCNC: 16 MG/DL (ref 6–20)
BUN/CREAT SERPL: 18 (ref 7–25)
CALCIUM SERPL-MCNC: 8.9 MG/DL (ref 8.4–10.2)
CHLORIDE SERPL-SCNC: 107 MMOL/L (ref 98–107)
CHOLEST SERPL-MCNC: 123 MG/DL
CHOLEST/HDLC SERPL: 2.5 {RATIO}
CO2 SERPL-SCNC: 27 MMOL/L (ref 21–32)
CREAT SERPL-MCNC: 0.88 MG/DL (ref 0.67–1.17)
ERYTHROCYTE [DISTWIDTH] IN BLOOD: 12.4 % (ref 11–15)
GLOBULIN SER-MCNC: 2.9 G/DL (ref 2–4)
GLUCOSE SERPL-MCNC: 80 MG/DL (ref 65–99)
HCT VFR BLD CALC: 49.6 % (ref 39–51)
HCV AB SER QL: NEGATIVE
HDLC SERPL-MCNC: 50 MG/DL
HGB BLD-MCNC: 16.3 G/DL (ref 13–17)
LDLC SERPL CALC-MCNC: 56 MG/DL
LENGTH OF FAST TIME PATIENT: ABNORMAL HRS
LENGTH OF FAST TIME PATIENT: NORMAL HRS
MCH RBC QN AUTO: 31.5 PG (ref 26–34)
MCHC RBC AUTO-ENTMCNC: 32.9 G/DL (ref 32–36.5)
MCV RBC AUTO: 95.9 FL (ref 78–100)
NONHDLC SERPL-MCNC: 73 MG/DL
NRBC BLD MANUAL-RTO: 0 /100 WBC
PLATELET # BLD: 234 K/MCL (ref 140–450)
POTASSIUM SERPL-SCNC: 4.6 MMOL/L (ref 3.4–5.1)
PROT SERPL-MCNC: 7 G/DL (ref 6.4–8.2)
RBC # BLD: 5.17 MIL/MCL (ref 4.5–5.9)
SODIUM SERPL-SCNC: 142 MMOL/L (ref 135–145)
TRIGL SERPL-MCNC: 87 MG/DL
TSH SERPL-ACNC: 1.3 MCUNITS/ML (ref 0.35–5)
WBC # BLD: 6.7 K/MCL (ref 4.2–11)

## 2021-01-01 ENCOUNTER — EXTERNAL RECORD (OUTPATIENT)
Dept: HEALTH INFORMATION MANAGEMENT | Facility: OTHER | Age: 71
End: 2021-01-01

## 2021-05-03 ENCOUNTER — TELEPHONE (OUTPATIENT)
Dept: INTERNAL MEDICINE | Age: 71
End: 2021-05-03

## 2021-05-25 VITALS
SYSTOLIC BLOOD PRESSURE: 120 MMHG | OXYGEN SATURATION: 97 % | HEART RATE: 53 BPM | DIASTOLIC BLOOD PRESSURE: 82 MMHG | HEART RATE: 52 BPM | TEMPERATURE: 97.4 F | TEMPERATURE: 96.1 F | HEIGHT: 72 IN | BODY MASS INDEX: 30.34 KG/M2 | SYSTOLIC BLOOD PRESSURE: 108 MMHG | OXYGEN SATURATION: 95 % | RESPIRATION RATE: 14 BRPM | WEIGHT: 212 LBS | DIASTOLIC BLOOD PRESSURE: 76 MMHG | WEIGHT: 224 LBS | HEIGHT: 72 IN | RESPIRATION RATE: 16 BRPM | BODY MASS INDEX: 28.71 KG/M2

## 2022-04-05 ENCOUNTER — TELEPHONE (OUTPATIENT)
Dept: INTERNAL MEDICINE | Age: 72
End: 2022-04-05

## 2022-04-05 DIAGNOSIS — H91.93 BILATERAL HEARING LOSS, UNSPECIFIED HEARING LOSS TYPE: Primary | ICD-10-CM

## 2022-10-03 ENCOUNTER — TELEPHONE (OUTPATIENT)
Dept: INTERNAL MEDICINE | Age: 72
End: 2022-10-03

## 2022-10-11 ENCOUNTER — EXTERNAL RECORD (OUTPATIENT)
Dept: HEALTH INFORMATION MANAGEMENT | Facility: OTHER | Age: 72
End: 2022-10-11

## 2023-02-10 ENCOUNTER — OFFICE VISIT (OUTPATIENT)
Dept: FAMILY MEDICINE CLINIC | Facility: CLINIC | Age: 73
End: 2023-02-10

## 2023-02-10 ENCOUNTER — APPOINTMENT (OUTPATIENT)
Dept: LAB | Facility: CLINIC | Age: 73
End: 2023-02-10

## 2023-02-10 VITALS
BODY MASS INDEX: 29.99 KG/M2 | SYSTOLIC BLOOD PRESSURE: 130 MMHG | OXYGEN SATURATION: 97 % | WEIGHT: 221.38 LBS | HEART RATE: 59 BPM | RESPIRATION RATE: 18 BRPM | HEIGHT: 72 IN | DIASTOLIC BLOOD PRESSURE: 80 MMHG | TEMPERATURE: 97.6 F

## 2023-02-10 DIAGNOSIS — Z12.5 ENCOUNTER FOR SCREENING FOR MALIGNANT NEOPLASM OF PROSTATE: ICD-10-CM

## 2023-02-10 DIAGNOSIS — I25.10 CORONARY ARTERY DISEASE INVOLVING NATIVE CORONARY ARTERY OF NATIVE HEART WITHOUT ANGINA PECTORIS: ICD-10-CM

## 2023-02-10 DIAGNOSIS — E78.5 MILD HYPERLIPIDEMIA: ICD-10-CM

## 2023-02-10 DIAGNOSIS — K21.9 GASTROESOPHAGEAL REFLUX DISEASE WITHOUT ESOPHAGITIS: ICD-10-CM

## 2023-02-10 DIAGNOSIS — Z12.11 COLON CANCER SCREENING: Primary | ICD-10-CM

## 2023-02-10 DIAGNOSIS — R35.1 NOCTURIA: ICD-10-CM

## 2023-02-10 LAB — PSA SERPL-MCNC: 5.9 NG/ML (ref 0–4)

## 2023-02-10 RX ORDER — ATORVASTATIN CALCIUM 40 MG/1
TABLET, FILM COATED ORAL
COMMUNITY
Start: 2022-11-23

## 2023-02-10 RX ORDER — FLUTICASONE PROPIONATE 50 MCG
2 SPRAY, SUSPENSION (ML) NASAL DAILY
COMMUNITY
Start: 2022-09-28

## 2023-02-10 RX ORDER — OMEPRAZOLE 20 MG/1
20 CAPSULE, DELAYED RELEASE ORAL
COMMUNITY

## 2023-02-10 NOTE — PROGRESS NOTES
Assessment and Plan:     Problem List Items Addressed This Visit        Digestive    GERD (gastroesophageal reflux disease)     GERD  Pt to continue with PPI medication  Relevant Medications    omeprazole (PriLOSEC) 20 mg delayed release capsule       Cardiovascular and Mediastinum    Coronary artery disease involving native coronary artery of native heart without angina pectoris     CAD  Pt condition stable  Continue current meds  Pt to f/u with cardiology for routine check up  Other    Mild hyperlipidemia     Hyperlipidemia  Pt will continue with statin therapy  Relevant Medications    atorvastatin (LIPITOR) 40 mg tablet   Other Visit Diagnoses     Colon cancer screening    -  Primary    Relevant Orders    Cologuard    Nocturia        Relevant Orders    PSA, Total Screen (Completed)    Encounter for screening for malignant neoplasm of prostate        Relevant Orders    PSA, Total Screen (Completed)           Preventive health issues were discussed with patient, and age appropriate screening tests were ordered as noted in patient's After Visit Summary  Personalized health advice and appropriate referrals for health education or preventive services given if needed, as noted in patient's After Visit Summary  History of Present Illness:     Patient presents for a Medicare Wellness Visit    This is first office visit for pt to establish new PCP  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   He has extensive medical hx of CAD s/p CABG in 2019  His cardiologist in [place] is a personal friend  He has always been active with running, cross country bike riding, and swimming  He tecently recobered from an ankle fracture  He does experience intermittent knee pains from chondromalacia  Pt had episode of vtach in the past  He is reluctant to have colonoscopyt due to aneathesia  He would prefer to do a cologuard  He has intermittent R ear drainage   His current neighbor, Dr Dakota Chauhan will be making appt for pt to have ENT eval        Patient Care Team:  Radha Mccracken MD as PCP - General (Family Medicine)     Review of Systems:     Review of Systems   Constitutional: Negative  HENT:        As per HPI   Eyes: Negative  Respiratory: Negative  Cardiovascular: Negative  Gastrointestinal: Negative  Genitourinary: Positive for difficulty urinating  Musculoskeletal: Positive for arthralgias  Skin: Negative  Neurological: Negative  Psychiatric/Behavioral: Negative  Problem List:     Patient Active Problem List   Diagnosis   • Coronary artery disease involving native coronary artery of native heart without angina pectoris   • GERD (gastroesophageal reflux disease)   • Mild hyperlipidemia   • Presence of aortocoronary bypass graft   • Chondromalacia of knee      Past Medical and Surgical History:     History reviewed  No pertinent past medical history    Past Surgical History:   Procedure Laterality Date   • ANKLE SURGERY Right    • CORONARY ARTERY BYPASS GRAFT  2 years ago   • HERNIA REPAIR        Family History:     Family History   Problem Relation Age of Onset   • Stroke Brother    • Cancer Brother       Social History:     Social History     Socioeconomic History   • Marital status: Single     Spouse name: None   • Number of children: None   • Years of education: None   • Highest education level: None   Occupational History   • None   Tobacco Use   • Smoking status: Never   • Smokeless tobacco: Never   Vaping Use   • Vaping Use: Never used   Substance and Sexual Activity   • Alcohol use: Never   • Drug use: Never   • Sexual activity: Yes   Other Topics Concern   • None   Social History Narrative   • None     Social Determinants of Health     Financial Resource Strain: Low Risk    • Difficulty of Paying Living Expenses: Not very hard   Food Insecurity: Not on file   Transportation Needs: No Transportation Needs   • Lack of Transportation (Medical): No   • Lack of Transportation (Non-Medical): No   Physical Activity: Not on file   Stress: Not on file   Social Connections: Not on file   Intimate Partner Violence: Not on file   Housing Stability: Not on file      Medications and Allergies:     Current Outpatient Medications   Medication Sig Dispense Refill   • ASPIRIN 81 PO Take 81 mg by mouth in the morning     • atorvastatin (LIPITOR) 40 mg tablet TAKE 1 TABLET BY MOUTH DAILY  AVOID GRAPEFRUIT JUICE     • fluticasone (FLONASE) 50 mcg/act nasal spray 2 sprays by Each Nare route daily     • omeprazole (PriLOSEC) 20 mg delayed release capsule Take 20 mg by mouth     • atorvastatin (LIPITOR) 10 mg tablet Take 10 mg by mouth daily (Patient not taking: Reported on 2/10/2023)       No current facility-administered medications for this visit  Allergies   Allergen Reactions   • Propofol Other (See Comments) and Seizures      Immunizations:     Immunization History   Administered Date(s) Administered   • INFLUENZA 10/17/2014   • Influenza Split High Dose Preservative Free IM 11/17/2017, 10/15/2019   • Influenza, seasonal, injectable, preservative free 10/28/2014   • Pneumococcal Conjugate 13-Valent 10/20/2018   • Pneumococcal Polysaccharide PPV23 11/23/2020   • Tdap 10/18/2015   • Zoster 10/17/2014   • influenza, trivalent, adjuvanted 10/20/2018      Health Maintenance:         Topic Date Due   • Hepatitis C Screening  Never done   • Colorectal Cancer Screening  Never done         Topic Date Due   • COVID-19 Vaccine (1) Never done      Medicare Screening Tests and Risk Assessments:         Health Risk Assessment:   Patient rates overall health as good  Patient feels that their physical health rating is same  Patient is very satisfied with their life  Eyesight was rated as same  Hearing was rated as same  Patient feels that their emotional and mental health rating is slightly better  Patients states they are never, rarely angry   Patient states they are never, rarely unusually tired/fatigued  Pain experienced in the last 7 days has been none  Patient states that he has experienced no weight loss or gain in last 6 months  Depression Screening:   PHQ-2 Score: 0      Fall Risk Screening: In the past year, patient has experienced: no history of falling in past year      Home Safety:  Patient has trouble with stairs inside or outside of their home  Patient has working smoke alarms and has working carbon monoxide detector  Home safety hazards include: none  Nutrition:   Current diet is Regular  Medications:   Patient is currently taking over-the-counter supplements  OTC medications include: see medication list  Patient is able to manage medications  Activities of Daily Living (ADLs)/Instrumental Activities of Daily Living (IADLs):   Walk and transfer into and out of bed and chair?: Yes  Dress and groom yourself?: Yes    Bathe or shower yourself?: Yes    Feed yourself?  Yes  Do your laundry/housekeeping?: Yes  Manage your money, pay your bills and track your expenses?: Yes  Make your own meals?: Yes    Do your own shopping?: Yes    Previous Hospitalizations:   Any hospitalizations or ED visits within the last 12 months?: No      Advance Care Planning:   Living will: Yes    Advanced directive: Yes      PREVENTIVE SCREENINGS      Cardiovascular Screening:    General: Screening Current      Diabetes Screening:     General: Screening Current      Colorectal Cancer Screening:     General: Risks and Benefits Discussed    Due for: Cologuard      Prostate Cancer Screening:    General: Risks and Benefits Discussed    Due for: PSA      Abdominal Aortic Aneurysm (AAA) Screening:    Risk factors include: age between 73-69 yo        Lung Cancer Screening:     General: Screening Not Indicated    Screening, Brief Intervention, and Referral to Treatment (SBIRT)    Screening    Typical number of drinks in a week: 0    Single Item Drug Screening:  How often have you used an illegal drug (including marijuana) or a prescription medication for non-medical reasons in the past year? never    Single Item Drug Screen Score: 0  Interpretation: Negative screen for possible drug use disorder    No results found  Physical Exam:     /80   Pulse 59   Temp 97 6 °F (36 4 °C)   Resp 18   Ht 5' 11 65" (1 82 m)   Wt 100 kg (221 lb 6 oz)   SpO2 97%   BMI 30 31 kg/m²     Physical Exam  Vitals and nursing note reviewed  Constitutional:       General: He is not in acute distress  Appearance: Normal appearance  He is well-developed  He is not ill-appearing  HENT:      Head: Normocephalic and atraumatic  Eyes:      General:         Right eye: No discharge  Left eye: No discharge  Conjunctiva/sclera: Conjunctivae normal       Pupils: Pupils are equal, round, and reactive to light  Neck:      Vascular: No carotid bruit  Cardiovascular:      Rate and Rhythm: Normal rate and regular rhythm  Heart sounds: Normal heart sounds  No murmur heard  Pulmonary:      Effort: Pulmonary effort is normal  No respiratory distress  Breath sounds: Normal breath sounds  No wheezing or rhonchi  Abdominal:      General: Abdomen is flat  Bowel sounds are normal       Palpations: Abdomen is soft  Tenderness: There is no abdominal tenderness  There is no guarding or rebound  Musculoskeletal:      Right lower leg: No edema  Left lower leg: No edema  Lymphadenopathy:      Cervical: No cervical adenopathy  Skin:     General: Skin is warm and dry  Capillary Refill: Capillary refill takes less than 2 seconds  Neurological:      Mental Status: He is alert  Mental status is at baseline  Psychiatric:         Mood and Affect: Mood normal          Behavior: Behavior normal          Thought Content:  Thought content normal          Judgment: Judgment normal           Clement Powell MD

## 2023-02-10 NOTE — PATIENT INSTRUCTIONS
Medicare Preventive Visit Patient Instructions  Thank you for completing your Welcome to Medicare Visit or Medicare Annual Wellness Visit today  Your next wellness visit will be due in one year (2/11/2024)  The screening/preventive services that you may require over the next 5-10 years are detailed below  Some tests may not apply to you based off risk factors and/or age  Screening tests ordered at today's visit but not completed yet may show as past due  Also, please note that scanned in results may not display below  Preventive Screenings:  Service Recommendations Previous Testing/Comments   Colorectal Cancer Screening  · Colonoscopy    · Fecal Occult Blood Test (FOBT)/Fecal Immunochemical Test (FIT)  · Fecal DNA/Cologuard Test  · Flexible Sigmoidoscopy Age: 39-70 years old   Colonoscopy: every 10 years (May be performed more frequently if at higher risk)  OR  FOBT/FIT: every 1 year  OR  Cologuard: every 3 years  OR  Sigmoidoscopy: every 5 years  Screening may be recommended earlier than age 39 if at higher risk for colorectal cancer  Also, an individualized decision between you and your healthcare provider will decide whether screening between the ages of 74-80 would be appropriate   Colonoscopy: Not on file  FOBT/FIT: Not on file  Cologuard: Not on file  Sigmoidoscopy: Not on file          Prostate Cancer Screening Individualized decision between patient and health care provider in men between ages of 53-78   Medicare will cover every 12 months beginning on the day after your 50th birthday PSA: No results in last 5 years           Hepatitis C Screening Once for adults born between [facility]  More frequently in patients at high risk for Hepatitis C Hep C Antibody: Not on file        Diabetes Screening 1-2 times per year if you're at risk for diabetes or have pre-diabetes Fasting glucose: No results in last 5 years (No results in last 5 years)  A1C: No results in last 5 years (No results in last 5 years) Cholesterol Screening Once every 5 years if you don't have a lipid disorder  May order more often based on risk factors  Lipid panel: Not on file         Other Preventive Screenings Covered by Medicare:  1  Abdominal Aortic Aneurysm (AAA) Screening: covered once if your at risk  You're considered to be at risk if you have a family history of AAA or a male between the age of 73-68 who smoking at least 100 cigarettes in your lifetime  2  Lung Cancer Screening: covers low dose CT scan once per year if you meet all of the following conditions: (1) Age 50-69; (2) No signs or symptoms of lung cancer; (3) Current smoker or have quit smoking within the last 15 years; (4) You have a tobacco smoking history of at least 20 pack years (packs per day x number of years you smoked); (5) You get a written order from a healthcare provider  3  Glaucoma Screening: covered annually if you're considered high risk: (1) You have diabetes OR (2) Family history of glaucoma OR (3)  aged 48 and older OR (3)  American aged 72 and older  3  Osteoporosis Screening: covered every 2 years if you meet one of the following conditions: (1) Have a vertebral abnormality; (2) On glucocorticoid therapy for more than 3 months; (3) Have primary hyperparathyroidism; (4) On osteoporosis medications and need to assess response to drug therapy  5  HIV Screening: covered annually if you're between the age of 12-76  Also covered annually if you are younger than 13 and older than 72 with risk factors for HIV infection  For pregnant patients, it is covered up to 3 times per pregnancy      Immunizations:  Immunization Recommendations   Influenza Vaccine Annual influenza vaccination during flu season is recommended for all persons aged >= 6 months who do not have contraindications   Pneumococcal Vaccine   * Pneumococcal conjugate vaccine = PCV13 (Prevnar 13), PCV15 (Vaxneuvance), PCV20 (Prevnar 20)  * Pneumococcal polysaccharide vaccine = PPSV23 (Pneumovax) Adults 2364 years old: 1-3 doses may be recommended based on certain risk factors  Adults 72 years old: 1-2 doses may be recommended based off what pneumonia vaccine you previously received   Hepatitis B Vaccine 3 dose series if at intermediate or high risk (ex: diabetes, end stage renal disease, liver disease)   Tetanus (Td) Vaccine - COST NOT COVERED BY MEDICARE PART B Following completion of primary series, a booster dose should be given every 10 years to maintain immunity against tetanus  Td may also be given as tetanus wound prophylaxis  Tdap Vaccine - COST NOT COVERED BY MEDICARE PART B Recommended at least once for all adults  For pregnant patients, recommended with each pregnancy  Shingles Vaccine (Shingrix) - COST NOT COVERED BY MEDICARE PART B  2 shot series recommended in those aged 48 and above     Health Maintenance Due:      Topic Date Due   • Hepatitis C Screening  Never done   • Colorectal Cancer Screening  Never done     Immunizations Due:      Topic Date Due   • COVID-19 Vaccine (1) Never done     Advance Directives   What are advance directives? Advance directives are legal documents that state your wishes and plans for medical care  These plans are made ahead of time in case you lose your ability to make decisions for yourself  Advance directives can apply to any medical decision, such as the treatments you want, and if you want to donate organs  What are the types of advance directives? There are many types of advance directives, and each state has rules about how to use them  You may choose a combination of any of the following:  · Living will: This is a written record of the treatment you want  You can also choose which treatments you do not want, which to limit, and which to stop at a certain time  This includes surgery, medicine, IV fluid, and tube feedings  · Durable power of  for healthcare [facility]):   This is a written record that states who you want to make healthcare choices for you when you are unable to make them for yourself  This person, called a proxy, is usually a family member or a friend  You may choose more than 1 proxy  · Do not resuscitate (DNR) order:  A DNR order is used in case your heart stops beating or you stop breathing  It is a request not to have certain forms of treatment, such as CPR  A DNR order may be included in other types of advance directives  · Medical directive: This covers the care that you want if you are in a coma, near death, or unable to make decisions for yourself  You can list the treatments you want for each condition  Treatment may include pain medicine, surgery, blood transfusions, dialysis, IV or tube feedings, and a ventilator (breathing machine)  · Values history: This document has questions about your views, beliefs, and how you feel and think about life  This information can help others choose the care that you would choose  Why are advance directives important? An advance directive helps you control your care  Although spoken wishes may be used, it is better to have your wishes written down  Spoken wishes can be misunderstood, or not followed  Treatments may be given even if you do not want them  An advance directive may make it easier for your family to make difficult choices about your care  Weight Management   Why it is important to manage your weight:  Being overweight increases your risk of health conditions such as heart disease, high blood pressure, type 2 diabetes, and certain types of cancer  It can also increase your risk for osteoarthritis, sleep apnea, and other respiratory problems  Aim for a slow, steady weight loss  Even a small amount of weight loss can lower your risk of health problems  How to lose weight safely:  A safe and healthy way to lose weight is to eat fewer calories and get regular exercise   You can lose up about 1 pound a week by decreasing the number of calories you eat by 500 calories each day  Healthy meal plan for weight management:  A healthy meal plan includes a variety of foods, contains fewer calories, and helps you stay healthy  A healthy meal plan includes the following:  · Eat whole-grain foods more often  A healthy meal plan should contain fiber  Fiber is the part of grains, fruits, and vegetables that is not broken down by your body  Whole-grain foods are healthy and provide extra fiber in your diet  Some examples of whole-grain foods are whole-wheat breads and pastas, oatmeal, brown rice, and bulgur  · Eat a variety of vegetables every day  Include dark, leafy greens such as spinach, kale, elana greens, and mustard greens  Eat yellow and orange vegetables such as carrots, sweet potatoes, and winter squash  · Eat a variety of fruits every day  Choose fresh or canned fruit (canned in its own juice or light syrup) instead of juice  Fruit juice has very little or no fiber  · Eat low-fat dairy foods  Drink fat-free (skim) milk or 1% milk  Eat fat-free yogurt and low-fat cottage cheese  Try low-fat cheeses such as mozzarella and other reduced-fat cheeses  · Choose meat and other protein foods that are low in fat  Choose beans or other legumes such as split peas or lentils  Choose fish, skinless poultry (chicken or turkey), or lean cuts of red meat (beef or pork)  Before you cook meat or poultry, cut off any visible fat  · Use less fat and oil  Try baking foods instead of frying them  Add less fat, such as margarine, sour cream, regular salad dressing and mayonnaise to foods  Eat fewer high-fat foods  Some examples of high-fat foods include french fries, doughnuts, ice cream, and cakes  · Eat fewer sweets  Limit foods and drinks that are high in sugar  This includes candy, cookies, regular soda, and sweetened drinks  Exercise:  Exercise at least 30 minutes per day on most days of the week   Some examples of exercise include walking, biking, dancing, and swimming  You can also fit in more physical activity by taking the stairs instead of the elevator or parking farther away from stores  Ask your healthcare provider about the best exercise plan for you  © Copyright [employer] 2018 Information is for End User's use only and may not be sold, redistributed or otherwise used for commercial purposes   All illustrations and images included in CareNotes® are the copyrighted property of [employer]  or [address] [place] [address] [employer]

## 2023-02-12 PROBLEM — K21.9 GERD (GASTROESOPHAGEAL REFLUX DISEASE): Status: ACTIVE | Noted: 2020-02-27

## 2023-02-12 PROBLEM — Z95.1 PRESENCE OF AORTOCORONARY BYPASS GRAFT: Status: ACTIVE | Noted: 2018-12-27

## 2023-02-12 PROBLEM — E78.5 MILD HYPERLIPIDEMIA: Status: ACTIVE | Noted: 2020-11-23

## 2023-02-12 PROBLEM — M94.269 CHONDROMALACIA OF KNEE: Status: ACTIVE | Noted: 2023-02-12

## 2023-02-12 PROBLEM — I25.10 CORONARY ARTERY DISEASE INVOLVING NATIVE CORONARY ARTERY OF NATIVE HEART WITHOUT ANGINA PECTORIS: Status: ACTIVE | Noted: 2018-12-27

## 2023-02-28 LAB — COLOGUARD RESULT REPORTABLE: NEGATIVE

## 2023-03-01 ENCOUNTER — TELEPHONE (OUTPATIENT)
Dept: FAMILY MEDICINE CLINIC | Facility: CLINIC | Age: 73
End: 2023-03-01

## 2023-03-01 NOTE — TELEPHONE ENCOUNTER
----- Message from Aracely Law MD sent at 7/1/6891  5:58 AM EST -----  Recent Cologuard test was negative    Repeat test in 3 years

## 2023-07-03 ENCOUNTER — OFFICE VISIT (OUTPATIENT)
Dept: FAMILY MEDICINE CLINIC | Facility: CLINIC | Age: 73
End: 2023-07-03
Payer: MEDICARE

## 2023-07-03 ENCOUNTER — HOSPITAL ENCOUNTER (OUTPATIENT)
Dept: RADIOLOGY | Facility: HOSPITAL | Age: 73
Discharge: HOME | End: 2023-07-03
Payer: MEDICARE

## 2023-07-03 VITALS
WEIGHT: 223 LBS | TEMPERATURE: 97.5 F | RESPIRATION RATE: 18 BRPM | DIASTOLIC BLOOD PRESSURE: 76 MMHG | BODY MASS INDEX: 30.2 KG/M2 | OXYGEN SATURATION: 98 % | HEART RATE: 78 BPM | SYSTOLIC BLOOD PRESSURE: 134 MMHG | HEIGHT: 72 IN

## 2023-07-03 DIAGNOSIS — R06.02 SOB (SHORTNESS OF BREATH): ICD-10-CM

## 2023-07-03 DIAGNOSIS — R06.02 SOB (SHORTNESS OF BREATH): Primary | ICD-10-CM

## 2023-07-03 PROCEDURE — 71046 X-RAY EXAM CHEST 2 VIEWS: CPT

## 2023-07-03 PROCEDURE — 99214 OFFICE O/P EST MOD 30 MIN: CPT | Performed by: FAMILY MEDICINE

## 2023-07-03 NOTE — ASSESSMENT & PLAN NOTE
Shortness of breath. I suspect symptoms may be multifactorial.  Patient states he had stable cardiac work-up recently. EKG in the office appears stable.   He will obtain chest x-ray as well as pulmonary function testing for further evaluation we will make further recommendations pending results of test.

## 2023-07-03 NOTE — PROGRESS NOTES
FAMILY PRACTICE OFFICE VISIT       NAME: Robert Ling  AGE: 71 y.o. SEX: male       : 1950        MRN: [id number]    DATE: 7/3/2023  TIME: 5:03 PM    Assessment and Plan     Problem List Items Addressed This Visit        Other    SOB (shortness of breath) - Primary     Shortness of breath. I suspect symptoms may be multifactorial.  Patient states he had stable cardiac work-up recently. EKG in the office appears stable. He will obtain chest x-ray as well as pulmonary function testing for further evaluation we will make further recommendations pending results of test.         Relevant Orders    XR chest pa & lateral    Complete PFT with post bronchodilator           Chief Complaint   No chief complaint on file. History of Present Illness     Patient in the office with few months history of vague episodes of shortness of breath. He denies any chest pain but at times some pressure. Patient does try to stay active with exercise and bicycle riding. He had to slow down over the past 6 months due to a prior ankle fracture injury. He did see his cardiologist in 2022. He states he had blood work that was considered to be all recent blodd tests stable for pt. well as what he believes was a stress test and echocardiogram which again he believes was stable. He denies any recent illness. He is a non-smoker. Review of Systems   Review of Systems   Constitutional: Negative. HENT: Negative. Eyes: Negative. Respiratory: Positive for chest tightness and shortness of breath. Cardiovascular: Negative. Gastrointestinal: Negative. Endocrine: Negative. Genitourinary: Negative. Musculoskeletal: Negative. Skin: Negative. Neurological: Negative. Psychiatric/Behavioral: Negative.         Active Problem List     Patient Active Problem List   Diagnosis   • Coronary artery disease involving native coronary artery of native heart without angina pectoris   • GERD (gastroesophageal reflux disease)   • Mild hyperlipidemia   • Presence of aortocoronary bypass graft   • Chondromalacia of knee   • SOB (shortness of breath)       Past Medical History:  History reviewed. No pertinent past medical history. Past Surgical History:  Past Surgical History:   Procedure Laterality Date   • ANKLE SURGERY Right    • CORONARY ARTERY BYPASS GRAFT  2 years ago   • HERNIA REPAIR         Family History:  Family History   Problem Relation Age of Onset   • Stroke Brother    • Cancer Brother        Social History:  Social History     Socioeconomic History   • Marital status: Single     Spouse name: Not on file   • Number of children: Not on file   • Years of education: Not on file   • Highest education level: Not on file   Occupational History   • Not on file   Tobacco Use   • Smoking status: Never   • Smokeless tobacco: Never   Vaping Use   • Vaping Use: Never used   Substance and Sexual Activity   • Alcohol use: Never   • Drug use: Never   • Sexual activity: Yes   Other Topics Concern   • Not on file   Social History Narrative   • Not on file     Social Determinants of Health     Financial Resource Strain: Low Risk  (2/10/2023)    Overall Financial Resource Strain (CARDIA)    • Difficulty of Paying Living Expenses: Not very hard   Food Insecurity: Not on file   Transportation Needs: No Transportation Needs (2/10/2023)    PRAPARE - Transportation    • Lack of Transportation (Medical): No    • Lack of Transportation (Non-Medical):  No   Physical Activity: Not on file   Stress: Not on file   Social Connections: Not on file   Intimate Partner Violence: Not on file   Housing Stability: Not on file       Objective     Vitals:    07/03/23 1429   BP: 134/76   Pulse: 78   Resp: 18   Temp: 97.5 °F (36.4 °C)   SpO2: 98%     Wt Readings from Last 3 Encounters:   07/03/23 101 kg (223 lb)   02/10/23 100 kg (221 lb 6 oz)   09/21/20 97.5 kg (214 lb 15.2 oz)       Physical Exam  Constitutional:       General: He is not in acute distress. Appearance: Normal appearance. He is not ill-appearing. HENT:      Head: Normocephalic and atraumatic. Eyes:      General:         Right eye: No discharge. Left eye: No discharge. Extraocular Movements: Extraocular movements intact. Conjunctiva/sclera: Conjunctivae normal.      Pupils: Pupils are equal, round, and reactive to light. Neck:      Vascular: No carotid bruit. Cardiovascular:      Rate and Rhythm: Normal rate and regular rhythm. Heart sounds: Normal heart sounds. No murmur heard. Pulmonary:      Effort: Pulmonary effort is normal.      Breath sounds: Normal breath sounds. No wheezing, rhonchi or rales. Abdominal:      General: Abdomen is flat. Bowel sounds are normal. There is no distension. Palpations: Abdomen is soft. Tenderness: There is no abdominal tenderness. There is no guarding or rebound. Musculoskeletal:      Right lower leg: No edema. Left lower leg: No edema. Lymphadenopathy:      Cervical: No cervical adenopathy. Skin:     Findings: No rash. Neurological:      General: No focal deficit present. Mental Status: He is alert and oriented to person, place, and time. Cranial Nerves: No cranial nerve deficit. Psychiatric:         Mood and Affect: Mood normal.         Behavior: Behavior normal.         Thought Content:  Thought content normal.         Judgment: Judgment normal.     EKG showed sinus bradycardia 52 bpm, first-degree AV block, normal axis, negative acute ischemic changes  Pertinent Laboratory/Diagnostic Studies:  No results found for: "GLUCOSE", "BUN", "CREATININE", "CALCIUM", "NA", "K", "CO2", "CL"  No results found for: "ALT", "AST", "GGT", "ALKPHOS", "BILITOT"    No results found for: "WBC", "HGB", "HCT", "MCV", "PLT"    No results found for: "TSH"    No results found for: "CHOL"  No results found for: "TRIG"  No results found for: "HDL"  No results found for: "LDLCALC"  No results found for: "HGBA1C"    Results for orders placed or performed in visit on 02/10/23   PSA, Total Screen   Result Value Ref Range    PSA 5.9 (H) 0.0 - 4.0 ng/mL       Orders Placed This Encounter   Procedures   • XR chest pa & lateral   • Complete PFT with post bronchodilator       ALLERGIES:  Allergies   Allergen Reactions   • Propofol Other (See Comments) and Seizures       Current Medications     Current Outpatient Medications   Medication Sig Dispense Refill   • ASPIRIN 81 PO Take 81 mg by mouth in the morning     • atorvastatin (LIPITOR) 40 mg tablet TAKE 1 TABLET BY MOUTH DAILY. AVOID GRAPEFRUIT JUICE     • fluticasone (FLONASE) 50 mcg/act nasal spray 2 sprays by Each Nare route daily     • omeprazole (PriLOSEC) 20 mg delayed release capsule Take 20 mg by mouth     • atorvastatin (LIPITOR) 10 mg tablet Take 10 mg by mouth daily (Patient not taking: Reported on 2/10/2023)       No current facility-administered medications for this visit.          Health Maintenance     Health Maintenance   Topic Date Due   • Hepatitis C Screening  Never done   • COVID-19 Vaccine (1) Never done   • BMI: Followup Plan  Never done   • Influenza Vaccine (1) 09/01/2023   • Fall Risk  02/10/2024   • Depression Screening  02/10/2024   • Medicare Annual Wellness Visit (AWV)  02/10/2024   • BMI: Adult  07/03/2024   • Colorectal Cancer Screening  02/28/2026   • Pneumococcal Vaccine: 65+ Years  Completed   • HIB Vaccine  Aged Out   • IPV Vaccine  Aged Out   • Hepatitis A Vaccine  Aged Out   • Meningococcal ACWY Vaccine  Aged Out   • HPV Vaccine  Aged Out     Immunization History   Administered Date(s) Administered   • INFLUENZA 10/17/2014   • Influenza Split High Dose Preservative Free IM 11/17/2017, 10/15/2019   • Influenza, seasonal, injectable, preservative free 10/28/2014   • Pneumococcal Conjugate 13-Valent 10/20/2018   • Pneumococcal Polysaccharide PPV23 11/23/2020   • Tdap 10/18/2015   • Zoster 10/17/2014   • influenza, trivalent, adjuvanted 59/62/4400       Romero Coley MD    I spent 30 minutes with this patient of which greater than 50% was spent counseling or reviewing chart

## 2023-07-12 ENCOUNTER — HOSPITAL ENCOUNTER (OUTPATIENT)
Dept: PULMONOLOGY | Facility: HOSPITAL | Age: 73
Discharge: HOME | End: 2023-07-12
Payer: MEDICARE

## 2023-07-12 DIAGNOSIS — R06.02 SOB (SHORTNESS OF BREATH): ICD-10-CM

## 2023-07-12 PROCEDURE — 94060 EVALUATION OF WHEEZING: CPT

## 2023-07-12 PROCEDURE — 94060 EVALUATION OF WHEEZING: CPT | Performed by: INTERNAL MEDICINE

## 2023-07-12 PROCEDURE — 94729 DIFFUSING CAPACITY: CPT | Performed by: INTERNAL MEDICINE

## 2023-07-12 PROCEDURE — 94726 PLETHYSMOGRAPHY LUNG VOLUMES: CPT | Performed by: INTERNAL MEDICINE

## 2023-07-12 PROCEDURE — 94729 DIFFUSING CAPACITY: CPT

## 2023-07-12 PROCEDURE — 94760 N-INVAS EAR/PLS OXIMETRY 1: CPT

## 2023-07-12 PROCEDURE — 94726 PLETHYSMOGRAPHY LUNG VOLUMES: CPT

## 2023-07-12 RX ORDER — ALBUTEROL SULFATE 2.5 MG/3ML
2.5 SOLUTION RESPIRATORY (INHALATION) ONCE
Status: COMPLETED | OUTPATIENT
Start: 2023-07-12 | End: 2023-07-12

## 2023-07-12 RX ADMIN — ALBUTEROL SULFATE 2.5 MG: 2.5 SOLUTION RESPIRATORY (INHALATION) at 08:43

## 2023-07-18 ENCOUNTER — TELEPHONE (OUTPATIENT)
Dept: FAMILY MEDICINE CLINIC | Facility: CLINIC | Age: 73
End: 2023-07-18

## 2023-07-18 DIAGNOSIS — Q31.8 VOCAL CORD ANOMALY: Primary | ICD-10-CM

## 2023-07-18 NOTE — TELEPHONE ENCOUNTER
----- Message from Estella Silverman MD sent at 1/53/2239  6:28 AM EDT -----  Recent pulmonary function test did not show any lung abnormalities however there was evidence of possible mild obstruction outside his bronchial tubes such as possible vocal cord area. Patient may want to consider ENT evaluation for his symptoms. If he is interested in proceeding I will place order in [facility] for him to see [facility].   Please provide phone number

## 2023-09-21 ENCOUNTER — EXTERNAL RECORD (OUTPATIENT)
Dept: HEALTH INFORMATION MANAGEMENT | Facility: OTHER | Age: 73
End: 2023-09-21

## 2023-10-10 ENCOUNTER — TELEPHONE (OUTPATIENT)
Dept: INTERNAL MEDICINE | Age: 73
End: 2023-10-10

## 2023-11-21 ENCOUNTER — TELEPHONE (OUTPATIENT)
Dept: INTERNAL MEDICINE | Age: 73
End: 2023-11-21

## 2023-12-21 ENCOUNTER — TELEPHONE (OUTPATIENT)
Dept: FAMILY MEDICINE CLINIC | Facility: CLINIC | Age: 73
End: 2023-12-21

## 2023-12-21 NOTE — TELEPHONE ENCOUNTER
Pt called and scheduled his AWV in Feb. Pt wondering if you recommend a dermatologist for his skin concerns?

## 2023-12-21 NOTE — TELEPHONE ENCOUNTER
If patient has any concerns regarding skin lesions I would recommend him seeing dermatologist,  Dr. Evelyn Devine in [place] otherwise through [facility] it may take 6 months to get an appointment.  Please provide phone number

## 2024-02-12 ENCOUNTER — OFFICE VISIT (OUTPATIENT)
Dept: FAMILY MEDICINE CLINIC | Facility: CLINIC | Age: 74
End: 2024-02-12
Payer: MEDICARE

## 2024-02-12 VITALS
BODY MASS INDEX: 31.22 KG/M2 | HEIGHT: 71 IN | HEART RATE: 57 BPM | SYSTOLIC BLOOD PRESSURE: 130 MMHG | RESPIRATION RATE: 18 BRPM | WEIGHT: 223 LBS | TEMPERATURE: 97.5 F | OXYGEN SATURATION: 97 % | DIASTOLIC BLOOD PRESSURE: 80 MMHG

## 2024-02-12 DIAGNOSIS — E78.5 MILD HYPERLIPIDEMIA: ICD-10-CM

## 2024-02-12 DIAGNOSIS — Z00.00 MEDICARE ANNUAL WELLNESS VISIT, SUBSEQUENT: Primary | ICD-10-CM

## 2024-02-12 DIAGNOSIS — G47.00 INSOMNIA, UNSPECIFIED TYPE: ICD-10-CM

## 2024-02-12 DIAGNOSIS — I25.10 CORONARY ARTERY DISEASE INVOLVING NATIVE CORONARY ARTERY OF NATIVE HEART WITHOUT ANGINA PECTORIS: ICD-10-CM

## 2024-02-12 DIAGNOSIS — G47.09 OTHER INSOMNIA: ICD-10-CM

## 2024-02-12 PROCEDURE — 99214 OFFICE O/P EST MOD 30 MIN: CPT | Performed by: FAMILY MEDICINE

## 2024-02-12 PROCEDURE — G0439 PPPS, SUBSEQ VISIT: HCPCS | Performed by: FAMILY MEDICINE

## 2024-02-12 RX ORDER — ZOLPIDEM TARTRATE 5 MG/1
5 TABLET ORAL
Qty: 30 TABLET | Refills: 3 | Status: SHIPPED | OUTPATIENT
Start: 2024-02-12

## 2024-02-12 NOTE — PROGRESS NOTES
Assessment and Plan:     Problem List Items Addressed This Visit       Coronary artery disease involving native coronary artery of native heart without angina pectoris     CAD. Pt denies CP with activities. He stopped doing push ups which was causing muscular CP.         Relevant Orders    CBC    Comprehensive metabolic panel    Lipid panel    TSH, 3rd generation    PSA, Total Screen    Mild hyperlipidemia     Hyperlipidemia. Pt will check bw as ordered and continue statin med.         Relevant Orders    CBC    Comprehensive metabolic panel    Lipid panel    TSH, 3rd generation    Medicare annual wellness visit, subsequent - Primary    Other insomnia     Insomnia. Pt will try ambien 5mg po q hs prn.             Preventive health issues were discussed with patient, and age appropriate screening tests were ordered as noted in patient's After Visit Summary.  Personalized health advice and appropriate referrals for health education or preventive services given if needed, as noted in patient's After Visit Summary.     History of Present Illness:     Patient presents for a Medicare Wellness Visit    Pt in the office to review chronic medical conditions. He continues to travel [place] with work. He describes trouble with sleeping beyond 6 hous at a time. He sleeps 8:30pm until he awakens in early am. He tried otc PM med with minimal improvement.    He will be exercising with his bike riding in spring avg 30-50miles/week. He reports slightly changed BM where he is regular with frequency but varies with amount of stool material. He denies blood or pain with BM's. Cologuard up to date.       Patient Care Team:  Asad Eric MD as PCP - General (Family Medicine)     Review of Systems:     Review of Systems   Constitutional: Negative.    HENT: Negative.     Eyes: Negative.    Respiratory: Negative.     Cardiovascular: Negative.    Gastrointestinal:         As per HPI   Genitourinary: Negative.    Musculoskeletal:  Negative.    Skin: Negative.    Neurological: Negative.    Psychiatric/Behavioral:  Positive for sleep disturbance.         Problem List:     Patient Active Problem List   Diagnosis    Coronary artery disease involving native coronary artery of native heart without angina pectoris    GERD (gastroesophageal reflux disease)    Mild hyperlipidemia    Presence of aortocoronary bypass graft    Chondromalacia of knee    SOB (shortness of breath)    Medicare annual wellness visit, subsequent    Other insomnia      Past Medical and Surgical History:     History reviewed. No pertinent past medical history.  Past Surgical History:   Procedure Laterality Date    ANKLE SURGERY Right     CORONARY ARTERY BYPASS GRAFT  2 years ago    HERNIA REPAIR        Family History:     Family History   Problem Relation Age of Onset    Stroke Brother     Cancer Brother       Social History:     Social History     Socioeconomic History    Marital status: Single     Spouse name: None    Number of children: None    Years of education: None    Highest education level: None   Occupational History    None   Tobacco Use    Smoking status: Never    Smokeless tobacco: Never   Vaping Use    Vaping status: Never Used   Substance and Sexual Activity    Alcohol use: Never    Drug use: Never    Sexual activity: Yes   Other Topics Concern    None   Social History Narrative    None     Social Determinants of Health     Financial Resource Strain: Low Risk  (2/11/2024)    Overall Financial Resource Strain (CARDIA)     Difficulty of Paying Living Expenses: Not very hard   Food Insecurity: Not on file   Transportation Needs: No Transportation Needs (2/11/2024)    PRAPARE - Transportation     Lack of Transportation (Medical): No     Lack of Transportation (Non-Medical): No   Physical Activity: Sufficiently Active (11/23/2020)    Received from [facility]    Exercise Vital Sign     Days of Exercise per Week: 7 days     Minutes of Exercise per Session: 90  min   Stress: Not on file   Social Connections: Unknown (3/14/2021)    Received from [facility]    Social Connections     Conversations with friends/family/neighbors per week: Not on file   Intimate Partner Violence: Not on file   Housing Stability: Low Risk  (7/10/2021)    Received from [facility]    Housing Stability     Mortgage Payment Concerns?: Not on file     Number of Places Lived in the Last Year: Not on file     Unstable Housing?: Not on file      Medications and Allergies:     Current Outpatient Medications   Medication Sig Dispense Refill    ASPIRIN 81 PO Take 81 mg by mouth in the morning      atorvastatin (LIPITOR) 40 mg tablet TAKE 1 TABLET BY MOUTH DAILY. AVOID GRAPEFRUIT JUICE      fluticasone (FLONASE) 50 mcg/act nasal spray 2 sprays by Each Nare route daily      omeprazole (PriLOSEC) 20 mg delayed release capsule Take 20 mg by mouth       No current facility-administered medications for this visit.     Allergies   Allergen Reactions    Propofol Other (See Comments) and Seizures      Immunizations:     Immunization History   Administered Date(s) Administered    COVID-19 Moderna mRNA Vaccine 12 Yr+ 50 mcg/0.5 mL (Spikevax) 10/01/2023    COVID-19 Pfizer Vac BIVALENT Tomasz-sucrose 12 Yr+ IM 01/03/2023    INFLUENZA 10/17/2014, 10/28/2022, 10/01/2023    Influenza Split High Dose Preservative Free IM 11/17/2017, 10/15/2019    Influenza, seasonal, injectable, preservative free 10/28/2014    Pneumococcal Conjugate 13-Valent 10/20/2018    Pneumococcal Polysaccharide PPV23 11/23/2020    Tdap 10/18/2015    Zoster 10/17/2014    influenza, trivalent, adjuvanted 10/20/2018      Health Maintenance:         Topic Date Due    Hepatitis C Screening  Never done    Colorectal Cancer Screening  02/21/2026         Topic Date Due    COVID-19 Vaccine (3 - 2023-24 season) 11/26/2023      Medicare Screening Tests and Risk Assessments:         Health Risk Assessment:   Patient rates  overall health as good. Patient feels that their physical health rating is same. Patient is satisfied with their life. Eyesight was rated as same. Hearing was rated as same. Patient feels that their emotional and mental health rating is same. Patients states they are never, rarely angry. Patient states they are sometimes unusually tired/fatigued. Pain experienced in the last 7 days has been none. Patient states that he has experienced no weight loss or gain in last 6 months.     Depression Screening:   PHQ-2 Score: 0      Fall Risk Screening:   In the past year, patient has experienced: no history of falling in past year      Home Safety:  Patient does not have trouble with stairs inside or outside of their home. Patient has working smoke alarms and has working carbon monoxide detector. Home safety hazards include: none.     Nutrition:   Current diet is Low Saturated Fat.     Medications:   Patient is currently taking over-the-counter supplements. OTC medications include: see medication list. Patient is able to manage medications.     Activities of Daily Living (ADLs)/Instrumental Activities of Daily Living (IADLs):   Walk and transfer into and out of bed and chair?: Yes  Dress and groom yourself?: Yes    Bathe or shower yourself?: Yes    Feed yourself? Yes  Do your laundry/housekeeping?: Yes  Manage your money, pay your bills and track your expenses?: Yes  Make your own meals?: Yes    Do your own shopping?: Yes    Durable Medical Equipment Suppliers  none    Previous Hospitalizations:   Any hospitalizations or ED visits within the last 12 months?: No      Advance Care Planning:   Living will: Yes    Durable POA for healthcare: Yes    Advanced directive: Yes      PREVENTIVE SCREENINGS      Cardiovascular Screening:    General: Screening Not Indicated and History Lipid Disorder      Diabetes Screening:     General: Risks and Benefits Discussed    Due for: Blood Glucose      Colorectal Cancer Screening:     General:  "Screening Current      Prostate Cancer Screening:    General: Risks and Benefits Discussed    Due for: PSA      Abdominal Aortic Aneurysm (AAA) Screening:    Risk factors include: age between 65-76 yo        Lung Cancer Screening:     General: Screening Not Indicated    Screening, Brief Intervention, and Referral to Treatment (SBIRT)    Screening  Typical number of drinks in a day: 0  Typical number of drinks in a week: 0  Interpretation: Low risk drinking behavior.    AUDIT-C Screenin) How often did you have a drink containing alcohol in the past year? never  2) How many drinks did you have on a typical day when you were drinking in the past year? 0  3) How often did you have 6 or more drinks on one occasion in the past year? never    AUDIT-C Score: 0  Interpretation: Score 0-3 (male): Negative screen for alcohol misuse    Single Item Drug Screening:  How often have you used an illegal drug (including marijuana) or a prescription medication for non-medical reasons in the past year? never    Single Item Drug Screen Score: 0  Interpretation: Negative screen for possible drug use disorder    No results found.     Physical Exam:     /80   Pulse 57   Temp 97.5 °F (36.4 °C)   Resp 18   Ht 5' 11\" (1.803 m)   Wt 101 kg (223 lb)   SpO2 97%   BMI 31.10 kg/m²     Physical Exam  Vitals and nursing note reviewed.   Constitutional:       General: He is not in acute distress.     Appearance: Normal appearance. He is well-developed. He is not ill-appearing.   HENT:      Head: Normocephalic and atraumatic.      Right Ear: Tympanic membrane, ear canal and external ear normal. There is no impacted cerumen.      Left Ear: Tympanic membrane, ear canal and external ear normal. There is no impacted cerumen.   Eyes:      General:         Right eye: No discharge.         Left eye: No discharge.      Extraocular Movements: Extraocular movements intact.      Conjunctiva/sclera: Conjunctivae normal.      Pupils: Pupils are " equal, round, and reactive to light.   Neck:      Vascular: No carotid bruit.   Cardiovascular:      Rate and Rhythm: Normal rate and regular rhythm.      Heart sounds: Normal heart sounds. No murmur heard.  Pulmonary:      Effort: Pulmonary effort is normal. No respiratory distress.      Breath sounds: Normal breath sounds. No wheezing or rhonchi.   Abdominal:      General: Abdomen is flat. Bowel sounds are normal.      Palpations: Abdomen is soft.      Tenderness: There is no abdominal tenderness. There is no guarding or rebound.   Musculoskeletal:         General: No swelling.      Cervical back: Neck supple.      Right lower leg: No edema.      Left lower leg: No edema.   Lymphadenopathy:      Cervical: No cervical adenopathy.   Skin:     General: Skin is warm and dry.      Capillary Refill: Capillary refill takes less than 2 seconds.   Neurological:      Mental Status: He is alert. Mental status is at baseline.   Psychiatric:         Mood and Affect: Mood normal.         Behavior: Behavior normal.         Thought Content: Thought content normal.         Judgment: Judgment normal.     I spent 30 minutes with this patient of which greater than 50% was spent counseling or reviewing chart    Asad Eric MD

## 2024-02-12 NOTE — PATIENT INSTRUCTIONS
Medicare Preventive Visit Patient Instructions  Thank you for completing your Welcome to Medicare Visit or Medicare Annual Wellness Visit today. Your next wellness visit will be due in one year (2/12/2025).  The screening/preventive services that you may require over the next 5-10 years are detailed below. Some tests may not apply to you based off risk factors and/or age. Screening tests ordered at today's visit but not completed yet may show as past due. Also, please note that scanned in results may not display below.  Preventive Screenings:  Service Recommendations Previous Testing/Comments   Colorectal Cancer Screening  Colonoscopy    Fecal Occult Blood Test (FOBT)/Fecal Immunochemical Test (FIT)  Fecal DNA/Cologuard Test  Flexible Sigmoidoscopy Age: 45-75 years old   Colonoscopy: every 10 years (May be performed more frequently if at higher risk)  OR  FOBT/FIT: every 1 year  OR  Cologuard: every 3 years  OR  Sigmoidoscopy: every 5 years  Screening may be recommended earlier than age 45 if at higher risk for colorectal cancer. Also, an individualized decision between you and your healthcare provider will decide whether screening between the ages of 76-85 would be appropriate. Colonoscopy: 02/21/2023  FOBT/FIT: 02/21/2023  Cologuard: 02/21/2023  Sigmoidoscopy: 02/21/2023    Screening Current     Prostate Cancer Screening Individualized decision between patient and health care provider in men between ages of 55-69   Medicare will cover every 12 months beginning on the day after your 50th birthday PSA: 5.9 ng/mL           Hepatitis C Screening Once for adults born between 1945 and 1965  More frequently in patients at high risk for Hepatitis C Hep C Antibody: Not on file        Diabetes Screening 1-2 times per year if you're at risk for diabetes or have pre-diabetes Fasting glucose: No results in last 5 years (No results in last 5 years)  A1C: No results in last 5 years (No results in last 5 years)      Cholesterol  Screening Once every 5 years if you don't have a lipid disorder. May order more often based on risk factors. Lipid panel: Not on file  Screening Not Indicated  History Lipid Disorder      Other Preventive Screenings Covered by Medicare:  Abdominal Aortic Aneurysm (AAA) Screening: covered once if your at risk. You're considered to be at risk if you have a family history of AAA or a male between the age of 65-75 who smoking at least 100 cigarettes in your lifetime.  Lung Cancer Screening: covers low dose CT scan once per year if you meet all of the following conditions: (1) Age 55-77; (2) No signs or symptoms of lung cancer; (3) Current smoker or have quit smoking within the last 15 years; (4) You have a tobacco smoking history of at least 20 pack years (packs per day x number of years you smoked); (5) You get a written order from a healthcare provider.  Glaucoma Screening: covered annually if you're considered high risk: (1) You have diabetes OR (2) Family history of glaucoma OR (3)  aged 50 and older OR (4)  American aged 65 and older  Osteoporosis Screening: covered every 2 years if you meet one of the following conditions: (1) Have a vertebral abnormality; (2) On glucocorticoid therapy for more than 3 months; (3) Have primary hyperparathyroidism; (4) On osteoporosis medications and need to assess response to drug therapy.  HIV Screening: covered annually if you're between the age of 15-65. Also covered annually if you are younger than 15 and older than 65 with risk factors for HIV infection. For pregnant patients, it is covered up to 3 times per pregnancy.    Immunizations:  Immunization Recommendations   Influenza Vaccine Annual influenza vaccination during flu season is recommended for all persons aged >= 6 months who do not have contraindications   Pneumococcal Vaccine   * Pneumococcal conjugate vaccine = PCV13 (Prevnar 13), PCV15 (Vaxneuvance), PCV20 (Prevnar 20)  * Pneumococcal  polysaccharide vaccine = PPSV23 (Pneumovax) Adults 19-65 yo with certain risk factors or if 65+ yo  If never received any pneumonia vaccine: recommend Prevnar 20 (PCV20)  Give PCV20 if previously received 1 dose of PCV13 or PPSV23   Hepatitis B Vaccine 3 dose series if at intermediate or high risk (ex: diabetes, end stage renal disease, liver disease)   Respiratory syncytial virus (RSV) Vaccine - COVERED BY MEDICARE PART D  * RSVPreF3 (Arexvy) CDC recommends that adults 60 years of age and older may receive a single dose of RSV vaccine using shared clinical decision-making (SCDM)   Tetanus (Td) Vaccine - COST NOT COVERED BY MEDICARE PART B Following completion of primary series, a booster dose should be given every 10 years to maintain immunity against tetanus. Td may also be given as tetanus wound prophylaxis.   Tdap Vaccine - COST NOT COVERED BY MEDICARE PART B Recommended at least once for all adults. For pregnant patients, recommended with each pregnancy.   Shingles Vaccine (Shingrix) - COST NOT COVERED BY MEDICARE PART B  2 shot series recommended in those 19 years and older who have or will have weakened immune systems or those 50 years and older     Health Maintenance Due:      Topic Date Due   • Hepatitis C Screening  Never done   • Colorectal Cancer Screening  02/21/2026     Immunizations Due:      Topic Date Due   • COVID-19 Vaccine (3 - 2023-24 season) 11/26/2023     Advance Directives   What are advance directives?  Advance directives are legal documents that state your wishes and plans for medical care. These plans are made ahead of time in case you lose your ability to make decisions for yourself. Advance directives can apply to any medical decision, such as the treatments you want, and if you want to donate organs.   What are the types of advance directives?  There are many types of advance directives, and each state has rules about how to use them. You may choose a combination of any of the  following:  Living will:  This is a written record of the treatment you want. You can also choose which treatments you do not want, which to limit, and which to stop at a certain time. This includes surgery, medicine, IV fluid, and tube feedings.   Durable power of  for healthcare (DPAHC):  This is a written record that states who you want to make healthcare choices for you when you are unable to make them for yourself. This person, called a proxy, is usually a family member or a friend. You may choose more than 1 proxy.  Do not resuscitate (DNR) order:  A DNR order is used in case your heart stops beating or you stop breathing. It is a request not to have certain forms of treatment, such as CPR. A DNR order may be included in other types of advance directives.  Medical directive:  This covers the care that you want if you are in a coma, near death, or unable to make decisions for yourself. You can list the treatments you want for each condition. Treatment may include pain medicine, surgery, blood transfusions, dialysis, IV or tube feedings, and a ventilator (breathing machine).  Values history:  This document has questions about your views, beliefs, and how you feel and think about life. This information can help others choose the care that you would choose.  Why are advance directives important?  An advance directive helps you control your care. Although spoken wishes may be used, it is better to have your wishes written down. Spoken wishes can be misunderstood, or not followed. Treatments may be given even if you do not want them. An advance directive may make it easier for your family to make difficult choices about your care.   Weight Management   Why it is important to manage your weight:  Being overweight increases your risk of health conditions such as heart disease, high blood pressure, type 2 diabetes, and certain types of cancer. It can also increase your risk for osteoarthritis, sleep apnea, and  other respiratory problems. Aim for a slow, steady weight loss. Even a small amount of weight loss can lower your risk of health problems.  How to lose weight safely:  A safe and healthy way to lose weight is to eat fewer calories and get regular exercise. You can lose up about 1 pound a week by decreasing the number of calories you eat by 500 calories each day.   Healthy meal plan for weight management:  A healthy meal plan includes a variety of foods, contains fewer calories, and helps you stay healthy. A healthy meal plan includes the following:  Eat whole-grain foods more often.  A healthy meal plan should contain fiber. Fiber is the part of grains, fruits, and vegetables that is not broken down by your body. Whole-grain foods are healthy and provide extra fiber in your diet. Some examples of whole-grain foods are whole-wheat breads and pastas, oatmeal, brown rice, and bulgur.  Eat a variety of vegetables every day.  Include dark, leafy greens such as spinach, kale, elana greens, and mustard greens. Eat yellow and orange vegetables such as carrots, sweet potatoes, and winter squash.   Eat a variety of fruits every day.  Choose fresh or canned fruit (canned in its own juice or light syrup) instead of juice. Fruit juice has very little or no fiber.  Eat low-fat dairy foods.  Drink fat-free (skim) milk or 1% milk. Eat fat-free yogurt and low-fat cottage cheese. Try low-fat cheeses such as mozzarella and other reduced-fat cheeses.  Choose meat and other protein foods that are low in fat.  Choose beans or other legumes such as split peas or lentils. Choose fish, skinless poultry (chicken or turkey), or lean cuts of red meat (beef or pork). Before you cook meat or poultry, cut off any visible fat.   Use less fat and oil.  Try baking foods instead of frying them. Add less fat, such as margarine, sour cream, regular salad dressing and mayonnaise to foods. Eat fewer high-fat foods. Some examples of high-fat foods  include french fries, doughnuts, ice cream, and cakes.  Eat fewer sweets.  Limit foods and drinks that are high in sugar. This includes candy, cookies, regular soda, and sweetened drinks.  Exercise:  Exercise at least 30 minutes per day on most days of the week. Some examples of exercise include walking, biking, dancing, and swimming. You can also fit in more physical activity by taking the stairs instead of the elevator or parking farther away from stores. Ask your healthcare provider about the best exercise plan for you.      © Copyright [employer] 2018 Information is for End User's use only and may not be sold, redistributed or otherwise used for commercial purposes. All illustrations and images included in CareNotes® are the copyrighted property of [employer]., Inc. or [employer]

## 2024-02-21 PROBLEM — Z00.00 MEDICARE ANNUAL WELLNESS VISIT, SUBSEQUENT: Status: RESOLVED | Noted: 2024-02-12 | Resolved: 2024-02-21

## 2024-02-27 ENCOUNTER — APPOINTMENT (OUTPATIENT)
Dept: LAB | Facility: CLINIC | Age: 74
End: 2024-02-27
Payer: MEDICARE

## 2024-02-27 DIAGNOSIS — I25.10 CORONARY ARTERY DISEASE INVOLVING NATIVE CORONARY ARTERY OF NATIVE HEART WITHOUT ANGINA PECTORIS: ICD-10-CM

## 2024-02-27 DIAGNOSIS — E78.5 MILD HYPERLIPIDEMIA: ICD-10-CM

## 2024-02-27 LAB
ALBUMIN SERPL BCP-MCNC: 4.4 G/DL (ref 3.5–5)
ALP SERPL-CCNC: 105 U/L (ref 34–104)
ALT SERPL W P-5'-P-CCNC: 25 U/L (ref 7–52)
ANION GAP SERPL CALCULATED.3IONS-SCNC: 5 MMOL/L
AST SERPL W P-5'-P-CCNC: 23 U/L (ref 13–39)
BILIRUB SERPL-MCNC: 0.74 MG/DL (ref 0.2–1)
BUN SERPL-MCNC: 15 MG/DL (ref 5–25)
CALCIUM SERPL-MCNC: 9.3 MG/DL (ref 8.4–10.2)
CHLORIDE SERPL-SCNC: 104 MMOL/L (ref 96–108)
CHOLEST SERPL-MCNC: 125 MG/DL
CO2 SERPL-SCNC: 31 MMOL/L (ref 21–32)
CREAT SERPL-MCNC: 0.94 MG/DL (ref 0.6–1.3)
ERYTHROCYTE [DISTWIDTH] IN BLOOD BY AUTOMATED COUNT: 12.3 % (ref 11.6–15.1)
GFR SERPL CREATININE-BSD FRML MDRD: 80 ML/MIN/1.73SQ M
GLUCOSE P FAST SERPL-MCNC: 68 MG/DL (ref 65–99)
HCT VFR BLD AUTO: 50.5 % (ref 36.5–49.3)
HDLC SERPL-MCNC: 44 MG/DL
HGB BLD-MCNC: 16.8 G/DL (ref 12–17)
LDLC SERPL CALC-MCNC: 48 MG/DL (ref 0–100)
MCH RBC QN AUTO: 31.5 PG (ref 26.8–34.3)
MCHC RBC AUTO-ENTMCNC: 33.3 G/DL (ref 31.4–37.4)
MCV RBC AUTO: 95 FL (ref 82–98)
NONHDLC SERPL-MCNC: 81 MG/DL
PLATELET # BLD AUTO: 248 THOUSANDS/UL (ref 149–390)
PMV BLD AUTO: 9.7 FL (ref 8.9–12.7)
POTASSIUM SERPL-SCNC: 4.4 MMOL/L (ref 3.5–5.3)
PROT SERPL-MCNC: 6.9 G/DL (ref 6.4–8.4)
PSA SERPL-MCNC: 8.44 NG/ML (ref 0–4)
RBC # BLD AUTO: 5.34 MILLION/UL (ref 3.88–5.62)
SODIUM SERPL-SCNC: 140 MMOL/L (ref 135–147)
TRIGL SERPL-MCNC: 166 MG/DL
TSH SERPL DL<=0.05 MIU/L-ACNC: 1.66 UIU/ML (ref 0.45–4.5)
WBC # BLD AUTO: 7.53 THOUSAND/UL (ref 4.31–10.16)

## 2024-02-27 PROCEDURE — 85027 COMPLETE CBC AUTOMATED: CPT

## 2024-02-27 PROCEDURE — 36415 COLL VENOUS BLD VENIPUNCTURE: CPT

## 2024-02-27 PROCEDURE — 80053 COMPREHEN METABOLIC PANEL: CPT

## 2024-02-27 PROCEDURE — 84443 ASSAY THYROID STIM HORMONE: CPT

## 2024-02-27 PROCEDURE — G0103 PSA SCREENING: HCPCS

## 2024-02-27 PROCEDURE — 80061 LIPID PANEL: CPT

## 2024-02-28 ENCOUNTER — TELEPHONE (OUTPATIENT)
Dept: FAMILY MEDICINE CLINIC | Facility: CLINIC | Age: 74
End: 2024-02-28

## 2024-02-28 DIAGNOSIS — R97.20 ELEVATED PSA: Primary | ICD-10-CM

## 2024-02-28 NOTE — TELEPHONE ENCOUNTER
Please contact patient.    His PSA is elevated at 8.44 it was 5.9 a years ago.  I recommend evaluation by [facility]'s urology.  They will determine further evaluation and treatment.  Referral placed.  Please ask him to schedule.  It might take him a few weeks to get an appointment, so this appointment will not interfere with his vacation.    The reminder of his blood work was essentially normal.    Thank you

## 2024-02-28 NOTE — TELEPHONE ENCOUNTER
Patient came into office.     He would like another PSA ordered so he can get repeat testing to assure the accuracy of the test.

## 2024-02-28 NOTE — TELEPHONE ENCOUNTER
I will place the order for diagnostic PSA.  Regardless, I strongly recommend to proceed with urology evaluation as patient's PSA was elevated a year ago and now.    Thank you

## 2024-04-03 ENCOUNTER — OFFICE VISIT (OUTPATIENT)
Dept: UROLOGY | Facility: AMBULATORY SURGERY CENTER | Age: 74
End: 2024-04-03
Payer: MEDICARE

## 2024-04-03 ENCOUNTER — TELEPHONE (OUTPATIENT)
Dept: UROLOGY | Facility: AMBULATORY SURGERY CENTER | Age: 74
End: 2024-04-03

## 2024-04-03 VITALS
HEART RATE: 56 BPM | RESPIRATION RATE: 18 BRPM | BODY MASS INDEX: 30.1 KG/M2 | SYSTOLIC BLOOD PRESSURE: 136 MMHG | OXYGEN SATURATION: 100 % | HEIGHT: 71 IN | DIASTOLIC BLOOD PRESSURE: 82 MMHG | WEIGHT: 215 LBS

## 2024-04-03 DIAGNOSIS — R97.20 ELEVATED PSA: Primary | ICD-10-CM

## 2024-04-03 PROCEDURE — 99203 OFFICE O/P NEW LOW 30 MIN: CPT | Performed by: UROLOGY

## 2024-04-03 NOTE — ASSESSMENT & PLAN NOTE
The pt's PSA is concerning.    We discussed the significence and 3 options.  - We can recheck a PSA in a short interval to better understand trend/dynamics and if PSA goes down this would be reassuring.  We redo this would ask him to hold off on bike riding for at least 2 to 4 weeks to get a more accurate assessment of his PSA in case his bike riding is the cause for elevation.    - We can obtain an MRI of the prostate which may influence us to pursue a biopsy (if PiRADS >3 lesion) or observation (if PiRADS <3)    - We can move forward with an in office prostate biopsy.    I recommend we move forward with an MRI of the prostate given his PSA was high last year and higher now and while it is possible his bike riding is playing a role unclear how much time we have to hold off on bike riding and how much is going to do so.  I think an MRI is more accurate.  He is agreeable.    Is his MRI returns PiRADS 1 or 2 would recommend repeat PSA in 3 months when he can hold off on bike riding for a few weeks.    He does move forward with a prostate biopsy he should not get propofol with fusion guided biopsy because of his prior significant coronary issue which occurred during hernia repair

## 2024-04-15 ENCOUNTER — HOSPITAL ENCOUNTER (OUTPATIENT)
Facility: MEDICAL CENTER | Age: 74
Discharge: HOME | End: 2024-04-15
Payer: MEDICARE

## 2024-04-15 DIAGNOSIS — R97.20 ELEVATED PSA: ICD-10-CM

## 2024-04-15 PROCEDURE — 76377 3D RENDER W/INTRP POSTPROCES: CPT

## 2024-04-15 PROCEDURE — A9585 GADOBUTROL INJECTION: HCPCS | Performed by: UROLOGY

## 2024-04-15 PROCEDURE — 72197 MRI PELVIS W/O & W/DYE: CPT

## 2024-04-15 RX ORDER — GADOBUTROL 604.72 MG/ML
9 INJECTION INTRAVENOUS
Status: COMPLETED | OUTPATIENT
Start: 2024-04-15 | End: 2024-04-15

## 2024-04-15 RX ADMIN — GADOBUTROL 9 ML: 604.72 INJECTION INTRAVENOUS at 14:25

## 2024-04-24 ENCOUNTER — TELEPHONE (OUTPATIENT)
Age: 74
End: 2024-04-24

## 2024-04-24 NOTE — TELEPHONE ENCOUNTER
Patient was calling in regards to the MRI results posting. He would like Dr. Smith to review them and give him a call to discuss next steps.     He would like to know if Dr. Smith still wanted to move forward with a biopsy. And starting the process to have that scheduled.     The patient would like a call back from Dr. Smith to further discuss    [place]  [phone]

## 2024-05-02 NOTE — TELEPHONE ENCOUNTER
Copying Dr. Smith's note below:      I called the patient about his result but got his voicemail.  Based on his PI-RADS 4 lesion I do recommend a fusion guided biopsy.  We will need to remind anesthesia as will the patient that he cannot get propofol for this procedure given his prior history of adverse reaction with coronary event during hernia repair        Please let him know that Dr. Smith definitely agrees with moving forward with a prostate biopsy at this time.  I will send this note over to our surgical scheduler and coordinate getting him set up for an MRI fusion biopsy.  He should hear from the  in a few days regarding a date for the procedure.  He should then plan to meet with Dr. Smith 2 weeks after biopsy to discuss pathology results.  He also has an appointment on May 13 which I believe can be canceled at this time.  If the patient has any other additional questions or concerns he can feel free to reach out to our office.

## 2024-05-03 NOTE — TELEPHONE ENCOUNTER
Patient is traveling and will not be back until tonight - Had questions about the procedure I will email them to him. - He has not been scheduled yet - [facility] will be calling to do so    Explained that they use that MRI that he had completed to assist with getting proper specimens - it is also documented that he cannot have propofol.    Emailed him information about biopsy to [email or web address]

## 2024-05-13 NOTE — TELEPHONE ENCOUNTER
Patient called to check status of his Bx appointment. He asked to be called tomorrow but not between 8am-10am because he'll be in a plane.    CB: [phone]

## 2024-05-13 NOTE — TELEPHONE ENCOUNTER
Spoke with patient after speaking with Maxine SANON in office, Pt needs to schedule fusion BX as stated in notes from [facility]. Sending to Surgery scheduler     [facility] please reach out to patient too schedule, thank you!

## 2024-05-13 NOTE — TELEPHONE ENCOUNTER
I am not sure who is covering for Alla while she is away, but can someone could reach out to this patient and schedule Fusion BX . Thank you

## 2024-05-13 NOTE — TELEPHONE ENCOUNTER
Patient called to say that he hasn't hear from SS yet and would like for Adriana or  to call back because the Sx scheduling procedure has been taking way to long.    CB: [phone]

## 2024-05-14 NOTE — TELEPHONE ENCOUNTER
I left a voice mail message returning the patients call. Offered 1st available 7/11/2024 at  [place] with Dr. Queen and asked that he call back to confirm.

## 2024-05-14 NOTE — TELEPHONE ENCOUNTER
Clinical, patient requires a 2 week pathology visit with Dr. Smith. None available, please contact the patient to arrange

## 2024-05-14 NOTE — TELEPHONE ENCOUNTER
Spoke with patient and confirmed surgery date of: 7/17/2024  Type of surgery: MRI Fusion BX  Operating physician: Dr. Gill  Location of surgery:  [facility]    Verbally went over prep with patient on: 5/14/2024  NPO  Bowel prep? Yes 1 Enema 1 hour prior to leaving the house for the procedure  Hospital calls afternoon prior with arrival time -Calls Friday afternoon for Monday surgeries  Patient needs ride to and from surgery (outpatient/inpatient)   Pre-op testing to be done 2 weeks prior to surgery  CBC, CMP, Urine C&S, EKG  Blood thinners:   ASA  Clearances needed: none    Emailed to patient on: 5/14/2024  Copy of packet scanned into [employer] on: 5/14/2024  Labs in packet  Soap / Bowel prep in packet  Pre-op & Post-op in packet  H&P on admit  post-op    Consent: on admit

## 2024-06-05 ENCOUNTER — NURSE TRIAGE (OUTPATIENT)
Age: 74
End: 2024-06-05

## 2024-06-05 NOTE — TELEPHONE ENCOUNTER
Patient called in inquiring if Dr. Smith could order MPS2 testing, which is a urine test that can identify high-grade tumors of the prostate and eliminate unnecessary biopsies. Please advise.

## 2024-06-06 NOTE — TELEPHONE ENCOUNTER
Can you please call Juan and let him know that I spoke with Dr. Smith and he does not believe that we currently offer this testing as it is relatively new. There would also be concern that insurance would not cover the test. The best and most effective way to rule out any underlying prostate cancer is to undergo a prostate biopsy and he should plan to proceed with that.

## 2024-06-30 NOTE — DISCHARGE INSTRUCTIONS
"Vigorous oral fluid intake and limited activity for the next 24 to 48 hours.  Report any excessive bleeding difficulty urinating or fevers   patient Education     Prostate biopsy   The Basics   Written by the doctors and editors at [facility]   What is a prostate biopsy? -- A prostate biopsy is a procedure to check the prostate for cancer or other problems. The prostate is a gland that surrounds the urethra (the tube that carries urine from the bladder out through the penis) (figure 1). For a biopsy, a doctor takes a small sample of tissue from the prostate.  You might get a prostate biopsy if:   A blood test shows that your PSA level is high - PSA stands for \"prostate-specific antigen.\" It is a protein made by the prostate. PSA levels usually go up when a person has prostate cancer. But they can also go up for reasons that do not involve cancer. A prostate biopsy can help your doctor figure out the cause of a high PSA.   Your doctor finds a problem during a \"digital rectal exam\" - For a digital rectal exam, your doctor puts a gloved finger into your rectum to feel your prostate. If they feel a lump or thickened area during this exam, they might do a prostate biopsy to help figure out the cause.  How do I prepare for prostate biopsy? -- Before your procedure, your doctor will do an exam and ask you about your \"health history.\" This involves asking you questions about any health problems you have or had in the past, past surgeries or biopsies, and any medicines you take. Tell them about:   Any medicines you are taking - This includes any prescription or \"over-the-counter\" medicines you use, plus any herbal supplements you take. It helps to write down and bring a list of any medicines you take, or bring a bag with all of your medicines with you.   Any allergies you have   Any bleeding problems you have - Certain medicines, including some herbs and supplements, can increase the risk of bleeding. Some health conditions " "also increase this risk. You might need to stop certain medicines for a time before your biopsy.  The doctor or nurse will tell you if you need to do anything special to prepare. In some cases, the doctor might prescribe a medicine to help you relax during the procedure.  Before the procedure, you might need to:   Use an enema or suppository to clean out your rectum.   Get a urine test to check for bacteria.   Take an antibiotic.  You will also get information about:   Eating and drinking before your procedure - In some cases, you might need to \"fast\" before the procedure. This means not eating or drinking anything for a period of time. In other cases, you might be allowed to have liquids until a short time before the procedure. Whether you need to fast, and for how long, depends on the procedure you are having.  Ask the doctor or nurse if you have questions or if there is anything you do not understand.  What happens during prostate biopsy? -- This depends on:   If you have had a prostate biopsy before   If the biopsy is done in a doctor's office or in the hospital   The type of biopsy   How many tissue samples the doctor takes  When it is time for the procedure:   You might get an \"IV,\" which is a thin tube that goes into a vein. This can be used to give you fluids and medicines.   You will get anesthesia medicines. This is to make sure that you do not feel pain during the procedure. Types of anesthesia include:   Local - This type of anesthesia uses medicine to numb a small part of your body so you don't feel pain.   Sedatives - These are medicines to make you relax and feel sleepy.   The doctors and nurses will have you lie on your side with your knees and hips bent for the procedure. They will monitor your breathing, blood pressure, and heart rate during the procedure.   The doctor might use an imaging test such as an ultrasound, MRI, or CT scan to take pictures of your prostate. The pictures will guide the " doctor as they do the biopsy. Sometimes, the imaging test is done before the procedure. Other times, the doctor does the imaging test during the procedure.   The doctor will take a small amount of tissue from the prostate. This can be done in 1 of 2 ways:   Transrectal biopsy - The doctor inserts a small ultrasound probe into the rectum. A needle goes through the probe and removes small pieces of tissue.   Transperineal biopsy - The doctor inserts the needle through the skin between the anus and the scrotum to take the tissue samples.   The procedure takes about 15 minutes.  What happens after prostate biopsy? -- After the procedure, the staff will watch you closely as your anesthesia wears off. Most of the time, you can go home a short time after your biopsy.  As you recover:   It is common to have a small amount of bright red blood from your rectum. You might also have a small amount of blood in your urine or semen.   You will get medicine to help with pain, if needed. You can take non-prescription medicines to relieve pain, such as acetaminophen (sample brand name: Tylenol). The doctor might recommend that you avoid ibuprofen (sample brand names: Advil, Motrin) and naproxen (sample brand name: Aleve) because they can increase your risk of bleeding.   Apply a cold gel pack, bag of ice, or bag of frozen vegetables to your perineum every 1 to 2 hours, for 15 minutes each time. Put a thin towel between the ice (or other cold object) and your skin. Some people find it helpful to use ice for the first 2 days after their biopsy.  You can go back to your normal activities after the procedure, including having sex. Some people are more comfortable if they avoid activities like riding a bike, motorcycle, or horse for about a week.  Your doctor or nurse will tell you when to expect your results.  What are the risks of prostate biopsy? -- Your doctor will talk to you about all of the possible risks, and answer your  questions. Possible risks include:   Infection   Bleeding from your rectum, or blood in your urine or semen   Very bad pain   A tear in the wall of the bladder or rectum   Trouble urinating   Trouble getting an erection  When should I call the doctor? -- Call for advice if:   You have a fever of 100.4°F (38°C) or higher, or chills.   You are not able to urinate, and your bladder feels full.   You have pain or burning when you urinate that lasts for more than a few days.   You have large blood clots (the size of a quarter or bigger) in your urine or start seeing more blood in your urine.   Your urine is thick or cloudy, or has a bad smell.   You have more bleeding from your rectum.   You have very bad pain in your belly that is not helped by pain relievers.  All topics are updated as new evidence becomes available and our peer review process is complete.  This topic retrieved from [employer] on: Mar 14, 2024.  Topic 595230 Version 1.0  Release: 32.2.4 - C32.72  © 2024 UpToDate, Inc. and/or its affiliates. All rights reserved.  figure 1: Prostate gland     This drawing shows male internal organs and a close-up of the prostate gland.  Graphic 58089 Version 5.0  Consumer Information Use and Disclaimer   Disclaimer: This generalized information is a limited summary of diagnosis, treatment, and/or medication information. It is not meant to be comprehensive and should be used as a tool to help the user understand and/or assess potential diagnostic and treatment options. It does NOT include all information about conditions, treatments, medications, side effects, or risks that may apply to a specific patient. It is not intended to be medical advice or a substitute for the medical advice, diagnosis, or treatment of a health care provider based on the health care provider's examination and assessment of a patient's specific and unique circumstances. Patients must speak with a health care provider for complete information about  their health, medical questions, and treatment options, including any risks or benefits regarding use of medications. This information does not endorse any treatments or medications as safe, effective, or approved for treating a specific patient. UpToDate, Inc. and its affiliates disclaim any warranty or liability relating to this information or the use thereof.The use of this information is governed by the Terms of Use, available at https://www.woltersGetixuwer.com/en/know/clinical-effectiveness-terms. 2024© UpToDate, Inc. and its affiliates and/or licensors. All rights reserved.  Copyright   © 2024 UpToDate, Inc. and/or its affiliates. All rights reserved.

## 2024-06-30 NOTE — H&P
H&P Exam - Urology   Juan Abdul 73 y.o. male MRN: [id number]  Unit/Bed#:  Encounter: [id number]    Assessment & Plan     Assessment:  Elevated PSA of 8.44  Increased PSA velocity  PI-RADS 4 lesion of the prostate on multiparametric MRI    Plan:  MR fusion guided transrectal ultrasound-guided transperineal needle biopsies of the prostate    History of Present Illness   HPI:  Juan Abdul is a 73 y.o. male who presents with an elevated PSA of 5.9 in February 2023 being seen by urology Dr. Smith in 2024 when repeat PSA was found  to be over 8.  Multiparametric MRI of the prostate revealed a 1.3 x 1.3 x 1.2 lesion in the right anterior transitional zone of the mid gland PI-RADS 4.  The patient now presents for MR fusion guided biopsy.  I reviewed the procedure as proposed step-by-step answered all patient questions performed his history and physical discussed risks and complications of false negative biopsy bleeding infection retention potential need for operative intervention or Ponce catheter.  The patient agreed to the procedure and provided verbal and signed informed consent    Review of Systems   All other systems reviewed and are negative.      Historical Information   No past medical history on file.  Past Surgical History:   Procedure Laterality Date    ANKLE SURGERY Right     CORONARY ARTERY BYPASS GRAFT  2 years ago    HERNIA REPAIR       Social History   Social History     Substance and Sexual Activity   Alcohol Use Never     Social History     Substance and Sexual Activity   Drug Use Never     Social History     Tobacco Use   Smoking Status Never   Smokeless Tobacco Never     Family History:   Family History   Problem Relation Age of Onset    Stroke Brother     Cancer Brother        Meds/Allergies   all medications and allergies reviewed  Allergies   Allergen Reactions    Propofol Other (See Comments) and Seizures       Objective   Vitals: There were no vitals taken for this visit.    No intake/output  data recorded.    Invasive Devices       None                   Physical Exam  Vitals reviewed.   Constitutional:       General: He is not in acute distress.     Appearance: Normal appearance. He is not ill-appearing, toxic-appearing or diaphoretic.   HENT:      Head: Normocephalic and atraumatic.      Nose: Nose normal.      Mouth/Throat:      Mouth: Mucous membranes are moist.   Eyes:      Extraocular Movements: Extraocular movements intact.   Cardiovascular:      Rate and Rhythm: Normal rate and regular rhythm.      Heart sounds: Normal heart sounds.   Pulmonary:      Effort: Pulmonary effort is normal. No respiratory distress.      Breath sounds: No wheezing or rales.   Abdominal:      General: Bowel sounds are normal. There is no distension.      Palpations: Abdomen is soft.      Tenderness: There is no abdominal tenderness. There is no guarding or rebound.   Musculoskeletal:         General: Normal range of motion.      Cervical back: Neck supple.   Skin:     General: Skin is dry.   Neurological:      Mental Status: He is alert and oriented to person, place, and time.   Psychiatric:         Mood and Affect: Mood normal.         Behavior: Behavior normal.         Thought Content: Thought content normal.         Judgment: Judgment normal.         Lab Results: I have personally reviewed pertinent reports.    Imaging: I have personally reviewed pertinent reports.   and I have personally reviewed pertinent films in PACS  EKG, Pathology, and Other Studies: I have personally reviewed pertinent reports.   and I have personally reviewed pertinent films in PACS  VTE Prophylaxis: Sequential compression device (Venodyne)     Code Status: No Order  Advance Directive and Living Will:      Power of :    POLST:      Counseling / Coordination of Care  Total floor / unit time spent today 30 minutes.  Greater than 50% of total time was spent with the patient and / or family counseling and / or coordination of care.  A  description of the counseling / coordination of care:  .

## 2024-07-03 ENCOUNTER — APPOINTMENT (OUTPATIENT)
Dept: LAB | Facility: HOSPITAL | Age: 74
End: 2024-07-03
Payer: MEDICARE

## 2024-07-03 ENCOUNTER — OFFICE VISIT (OUTPATIENT)
Dept: LAB | Facility: HOSPITAL | Age: 74
End: 2024-07-03
Payer: MEDICARE

## 2024-07-03 DIAGNOSIS — R97.20 ELEVATED PROSTATE SPECIFIC ANTIGEN (PSA): ICD-10-CM

## 2024-07-03 DIAGNOSIS — Z01.810 PRE-OPERATIVE CARDIOVASCULAR EXAMINATION: ICD-10-CM

## 2024-07-03 DIAGNOSIS — Z01.812 PRE-OPERATIVE LABORATORY EXAMINATION: ICD-10-CM

## 2024-07-03 DIAGNOSIS — R39.89 SUSPECTED UTI: ICD-10-CM

## 2024-07-03 LAB
ALBUMIN SERPL BCG-MCNC: 4.2 G/DL (ref 3.5–5)
ALP SERPL-CCNC: 109 U/L (ref 34–104)
ALT SERPL W P-5'-P-CCNC: 18 U/L (ref 7–52)
ANION GAP SERPL CALCULATED.3IONS-SCNC: 6 MMOL/L (ref 4–13)
AST SERPL W P-5'-P-CCNC: 21 U/L (ref 13–39)
BASOPHILS # BLD AUTO: 0.04 THOUSANDS/ÂΜL (ref 0–0.1)
BASOPHILS NFR BLD AUTO: 1 % (ref 0–1)
BILIRUB SERPL-MCNC: 0.51 MG/DL (ref 0.2–1)
BUN SERPL-MCNC: 13 MG/DL (ref 5–25)
CALCIUM SERPL-MCNC: 9.1 MG/DL (ref 8.4–10.2)
CHLORIDE SERPL-SCNC: 106 MMOL/L (ref 96–108)
CO2 SERPL-SCNC: 28 MMOL/L (ref 21–32)
CREAT SERPL-MCNC: 0.88 MG/DL (ref 0.6–1.3)
EOSINOPHIL # BLD AUTO: 0.26 THOUSAND/ÂΜL (ref 0–0.61)
EOSINOPHIL NFR BLD AUTO: 4 % (ref 0–6)
ERYTHROCYTE [DISTWIDTH] IN BLOOD BY AUTOMATED COUNT: 12.5 % (ref 11.6–15.1)
GFR SERPL CREATININE-BSD FRML MDRD: 85 ML/MIN/1.73SQ M
GLUCOSE SERPL-MCNC: 87 MG/DL (ref 65–140)
HCT VFR BLD AUTO: 44.7 % (ref 36.5–49.3)
HGB BLD-MCNC: 15.2 G/DL (ref 12–17)
IMM GRANULOCYTES # BLD AUTO: 0.02 THOUSAND/UL (ref 0–0.2)
IMM GRANULOCYTES NFR BLD AUTO: 0 % (ref 0–2)
LYMPHOCYTES # BLD AUTO: 1.83 THOUSANDS/ÂΜL (ref 0.6–4.47)
LYMPHOCYTES NFR BLD AUTO: 28 % (ref 14–44)
MCH RBC QN AUTO: 32.5 PG (ref 26.8–34.3)
MCHC RBC AUTO-ENTMCNC: 34 G/DL (ref 31.4–37.4)
MCV RBC AUTO: 96 FL (ref 82–98)
MONOCYTES # BLD AUTO: 0.79 THOUSAND/ÂΜL (ref 0.17–1.22)
MONOCYTES NFR BLD AUTO: 12 % (ref 4–12)
NEUTROPHILS # BLD AUTO: 3.62 THOUSANDS/ÂΜL (ref 1.85–7.62)
NEUTS SEG NFR BLD AUTO: 55 % (ref 43–75)
NRBC BLD AUTO-RTO: 0 /100 WBCS
PLATELET # BLD AUTO: 236 THOUSANDS/UL (ref 149–390)
PMV BLD AUTO: 9.4 FL (ref 8.9–12.7)
POTASSIUM SERPL-SCNC: 4.2 MMOL/L (ref 3.5–5.3)
PROT SERPL-MCNC: 6.7 G/DL (ref 6.4–8.4)
RBC # BLD AUTO: 4.67 MILLION/UL (ref 3.88–5.62)
SODIUM SERPL-SCNC: 140 MMOL/L (ref 135–147)
WBC # BLD AUTO: 6.56 THOUSAND/UL (ref 4.31–10.16)

## 2024-07-03 PROCEDURE — 80053 COMPREHEN METABOLIC PANEL: CPT

## 2024-07-03 PROCEDURE — 36415 COLL VENOUS BLD VENIPUNCTURE: CPT

## 2024-07-03 PROCEDURE — 85025 COMPLETE CBC W/AUTO DIFF WBC: CPT

## 2024-07-03 PROCEDURE — 87086 URINE CULTURE/COLONY COUNT: CPT

## 2024-07-03 PROCEDURE — 93005 ELECTROCARDIOGRAM TRACING: CPT

## 2024-07-04 LAB — BACTERIA UR CULT: NORMAL

## 2024-07-05 LAB
ATRIAL RATE: 54 BPM
P AXIS: 44 DEGREES
PR INTERVAL: 228 MS
QRS AXIS: 4 DEGREES
QRSD INTERVAL: 90 MS
QT INTERVAL: 438 MS
QTC INTERVAL: 415 MS
T WAVE AXIS: 39 DEGREES
VENTRICULAR RATE: 54 BPM

## 2024-07-05 PROCEDURE — 93010 ELECTROCARDIOGRAM REPORT: CPT | Performed by: INTERNAL MEDICINE

## 2024-07-08 NOTE — PRE-PROCEDURE INSTRUCTIONS
Pre-Surgery Instructions:   Medication Instructions    ASPIRIN 81 PO Instructions provided by MD    atorvastatin (LIPITOR) 40 mg tablet Take night before surgery    Cholecalciferol (VITAMIN D3 PO) Stop taking 7 days prior to surgery.    FIBER PO Stop taking 7 days prior to surgery.    omeprazole (PriLOSEC) 20 mg delayed release capsule Take day of surgery.    Probiotic Product (PROBIOTIC PO) Stop taking 7 days prior to surgery.    zolpidem (AMBIEN) 5 mg tablet Take night before surgery   Bowel prep- Instructions reviewed. Pt advised to contact the surgeon's office with any questions/concerns.    Medication instructions for day surgery reviewed. Please use only a sip of water to take your instructed medications. Avoid all over the counter vitamins, supplements and NSAIDS for one week prior to surgery per anesthesia guidelines. Tylenol is ok to take as needed.     You will receive a call one business day prior to surgery with an arrival time and hospital directions. If your surgery is scheduled on a Monday, the hospital will be calling you on the Friday prior to your surgery. If you have not heard from anyone by 8pm, please call the hospital supervisor through the hospital  at [phone]. ([place] [phone] or [place] [phone]).    Do not eat or drink anything after midnight the night before your surgery, including candy, mints, lifesavers, or chewing gum. Do not drink alcohol 24hrs before your surgery. Try not to smoke at least 24hrs before your surgery.       Follow the pre surgery showering instructions as listed in the “My Surgical Experience Booklet” or otherwise provided by your surgeon's office. Do not use a blade to shave the surgical area 1 week before surgery. It is okay to use a clean electric clippers up to 24 hours before surgery. Do not apply any lotions, creams, including makeup, cologne, deodorant, or perfumes after showering on the day of your surgery. Do not use dry shampoo, hair  spray, hair gel, or any type of hair products.     No contact lenses, eye make-up, or artificial eyelashes. Remove nail polish, including gel polish, and any artificial, gel, or acrylic nails if possible. Remove all jewelry including rings and body piercing jewelry.     Wear causal clothing that is easy to take on and off. Consider your type of surgery.    Keep any valuables, jewelry, piercings at home. Please bring any specially ordered equipment (sling, braces) if indicated.    Arrange for a responsible person to drive you to and from the hospital on the day of your surgery. Please confirm the visitor policy for the day of your procedure when you receive your phone call with an arrival time.     Call the surgeon's office with any new illnesses, exposures, or additional questions prior to surgery.    Please reference your “My Surgical Experience Booklet” for additional information to prepare for your upcoming surgery.

## 2024-07-09 ENCOUNTER — ANESTHESIA EVENT (OUTPATIENT)
Dept: PERIOP | Facility: HOSPITAL | Age: 74
End: 2024-07-09
Payer: MEDICARE

## 2024-07-17 ENCOUNTER — ANESTHESIA (OUTPATIENT)
Dept: PERIOP | Facility: HOSPITAL | Age: 74
End: 2024-07-17
Payer: MEDICARE

## 2024-07-17 ENCOUNTER — HOSPITAL ENCOUNTER (OUTPATIENT)
Facility: HOSPITAL | Age: 74
Setting detail: OUTPATIENT SURGERY
Discharge: HOME | End: 2024-07-17
Attending: UROLOGY | Admitting: UROLOGY
Payer: MEDICARE

## 2024-07-17 VITALS
TEMPERATURE: 97.1 F | HEIGHT: 71 IN | WEIGHT: 215.17 LBS | RESPIRATION RATE: 14 BRPM | DIASTOLIC BLOOD PRESSURE: 70 MMHG | HEART RATE: 51 BPM | SYSTOLIC BLOOD PRESSURE: 130 MMHG | BODY MASS INDEX: 30.12 KG/M2 | OXYGEN SATURATION: 99 %

## 2024-07-17 DIAGNOSIS — R97.20 ELEVATED PROSTATE SPECIFIC ANTIGEN (PSA): ICD-10-CM

## 2024-07-17 PROCEDURE — 55700 PR PROSTATE NEEDLE BIOPSY ANY APPROACH: CPT | Performed by: UROLOGY

## 2024-07-17 PROCEDURE — NC001 PR NO CHARGE: Performed by: UROLOGY

## 2024-07-17 PROCEDURE — 88344 IMHCHEM/IMCYTCHM EA MLT ANTB: CPT | Performed by: STUDENT IN AN ORGANIZED HEALTH CARE EDUCATION/TRAINING PROGRAM

## 2024-07-17 PROCEDURE — 76942 ECHO GUIDE FOR BIOPSY: CPT | Performed by: UROLOGY

## 2024-07-17 PROCEDURE — G0416 PROSTATE BIOPSY, ANY MTHD: HCPCS | Performed by: STUDENT IN AN ORGANIZED HEALTH CARE EDUCATION/TRAINING PROGRAM

## 2024-07-17 RX ORDER — ONDANSETRON 2 MG/ML
INJECTION INTRAMUSCULAR; INTRAVENOUS AS NEEDED
Status: DISCONTINUED | OUTPATIENT
Start: 2024-07-17 | End: 2024-07-17

## 2024-07-17 RX ORDER — FENTANYL CITRATE/PF 50 MCG/ML
25 SYRINGE (ML) INJECTION
Status: DISCONTINUED | OUTPATIENT
Start: 2024-07-17 | End: 2024-07-17 | Stop reason: HOSPADM

## 2024-07-17 RX ORDER — LIDOCAINE HYDROCHLORIDE AND EPINEPHRINE BITARTRATE 20; .01 MG/ML; MG/ML
INJECTION, SOLUTION SUBCUTANEOUS AS NEEDED
Status: DISCONTINUED | OUTPATIENT
Start: 2024-07-17 | End: 2024-07-17 | Stop reason: HOSPADM

## 2024-07-17 RX ORDER — SODIUM CHLORIDE, SODIUM LACTATE, POTASSIUM CHLORIDE, CALCIUM CHLORIDE 600; 310; 30; 20 MG/100ML; MG/100ML; MG/100ML; MG/100ML
125 INJECTION, SOLUTION INTRAVENOUS CONTINUOUS
Status: DISCONTINUED | OUTPATIENT
Start: 2024-07-17 | End: 2024-07-17 | Stop reason: HOSPADM

## 2024-07-17 RX ORDER — ONDANSETRON 2 MG/ML
4 INJECTION INTRAMUSCULAR; INTRAVENOUS ONCE AS NEEDED
Status: DISCONTINUED | OUTPATIENT
Start: 2024-07-17 | End: 2024-07-17 | Stop reason: HOSPADM

## 2024-07-17 RX ORDER — CEFAZOLIN SODIUM 2 G/50ML
2000 SOLUTION INTRAVENOUS ONCE
Status: COMPLETED | OUTPATIENT
Start: 2024-07-17 | End: 2024-07-17

## 2024-07-17 RX ORDER — GLYCOPYRROLATE 0.2 MG/ML
INJECTION INTRAMUSCULAR; INTRAVENOUS AS NEEDED
Status: DISCONTINUED | OUTPATIENT
Start: 2024-07-17 | End: 2024-07-17

## 2024-07-17 RX ORDER — FENTANYL CITRATE 50 UG/ML
INJECTION, SOLUTION INTRAMUSCULAR; INTRAVENOUS AS NEEDED
Status: DISCONTINUED | OUTPATIENT
Start: 2024-07-17 | End: 2024-07-17

## 2024-07-17 RX ORDER — MIDAZOLAM HYDROCHLORIDE 2 MG/2ML
INJECTION, SOLUTION INTRAMUSCULAR; INTRAVENOUS AS NEEDED
Status: DISCONTINUED | OUTPATIENT
Start: 2024-07-17 | End: 2024-07-17

## 2024-07-17 RX ADMIN — FENTANYL CITRATE 25 MCG: 50 INJECTION, SOLUTION INTRAMUSCULAR; INTRAVENOUS at 09:42

## 2024-07-17 RX ADMIN — ONDANSETRON 4 MG: 2 INJECTION INTRAMUSCULAR; INTRAVENOUS at 09:05

## 2024-07-17 RX ADMIN — FENTANYL CITRATE 50 MCG: 50 INJECTION, SOLUTION INTRAMUSCULAR; INTRAVENOUS at 09:17

## 2024-07-17 RX ADMIN — SODIUM CHLORIDE, SODIUM LACTATE, POTASSIUM CHLORIDE, AND CALCIUM CHLORIDE 125 ML/HR: .6; .31; .03; .02 INJECTION, SOLUTION INTRAVENOUS at 08:07

## 2024-07-17 RX ADMIN — MIDAZOLAM 1 MG: 1 INJECTION INTRAMUSCULAR; INTRAVENOUS at 09:08

## 2024-07-17 RX ADMIN — DEXMEDETOMIDINE 12 MCG: 200 INJECTION, SOLUTION INTRAVENOUS at 09:24

## 2024-07-17 RX ADMIN — MIDAZOLAM 1 MG: 1 INJECTION INTRAMUSCULAR; INTRAVENOUS at 09:24

## 2024-07-17 RX ADMIN — DEXMEDETOMIDINE 12 MCG: 200 INJECTION, SOLUTION INTRAVENOUS at 09:36

## 2024-07-17 RX ADMIN — MIDAZOLAM 1 MG: 1 INJECTION INTRAMUSCULAR; INTRAVENOUS at 09:20

## 2024-07-17 RX ADMIN — FENTANYL CITRATE 25 MCG: 50 INJECTION, SOLUTION INTRAMUSCULAR; INTRAVENOUS at 09:26

## 2024-07-17 RX ADMIN — GLYCOPYRROLATE 0.2 MG: 0.2 INJECTION, SOLUTION INTRAMUSCULAR; INTRAVENOUS at 09:16

## 2024-07-17 RX ADMIN — GLYCOPYRROLATE 0.2 MG: 0.2 INJECTION, SOLUTION INTRAMUSCULAR; INTRAVENOUS at 09:05

## 2024-07-17 RX ADMIN — MIDAZOLAM 1 MG: 1 INJECTION INTRAMUSCULAR; INTRAVENOUS at 09:05

## 2024-07-17 RX ADMIN — CEFAZOLIN SODIUM 2000 MG: 2 SOLUTION INTRAVENOUS at 09:05

## 2024-07-17 NOTE — INTERVAL H&P NOTE
H&P reviewed. After examining the patient I find no changes in the patients condition since the H&P had been written.    Vitals:    07/17/24 0739   BP: 145/70   Pulse: (!) 48   Resp: 16   Temp: 98 °F (36.7 °C)   SpO2: 96%

## 2024-07-17 NOTE — OP NOTE
OPERATIVE REPORT  PATIENT NAME: Juan Abdul    :  1950  MRN: [id number]  Pt Location: [facility]OM 01    SURGERY DATE: 2024    Surgeons and Role:     * Foreign Gill MD - Primary    Preop Diagnosis:  Elevated prostate specific antigen (PSA) [R97.20]  PI-RADS 4 lesion of the prostate on multiparametric MRI    Post-Op Diagnosis Codes:     * Elevated prostate specific antigen (PSA) [R97.20]  RADS 4 lesion of the prostate on multiparametric MRI    Procedure(s):  TRANSPERINEAL MRI FUSION GUIDED TRANSRECTAL ULTRASOUND-GUIDED BIOPSY PROSTATE    Specimen(s):  ID Type Source Tests Collected by Time Destination   1 : RIGHT ANTERIOR MEDIAL Tissue Prostate TISSUE EXAM Foreign Gill MD 2024 09    2 : Right Anterior lateral Tissue Prostate TISSUE EXAM Foreign Gill MD 2024 0902    3 : right base Tissue Prostate TISSUE EXAM Foreign Gill MD 2024 0902    4 : right posterior medial Tissue Prostate TISSUE EXAM Foreign Gill MD 2024 0902    5 : Left base Tissue Prostate TISSUE EXAM Foreign Gill MD 2024 0902    6 : left posterior medial Tissue Prostate TISSUE EXAM Foreign Gill MD 2024 0902    7 : Left posterior lateral Tissue Prostate TISSUE EXAM Foreign Gill MD 2024 0902    8 : left anterior lateral Tissue Prostate TISSUE EXAM Foreign Gill MD 2024 0902    9 : Left Anterior Medial Tissue Prostate TISSUE EXAM Foreign Gill MD 2024 0902    10 : Right posterior lateral Tissue Prostate TISSUE EXAM Foreign Gill MD 2024 0902    11 : REGION OF INTEREST Tissue Prostate TISSUE EXAM Foreign Gill MD 2024 0928    12 : RIGHT TRANSITIONAL ZONE Tissue Prostate TISSUE EXAM Foreign Gill MD 2024 0933    13 : LEFT TRANSITIOMAL ZOME Tissue Prostate TISSUE EXAM Foreign Gill MD 2024 0935        Estimated Blood Loss:   Minimal    Drains:  * No LDAs found *    Anesthesia Type:   IV Sedation with  Anesthesia    Operative Indications:  Elevated prostate specific antigen (PSA) [R97.20]  PI-RADS 4 lesion on multiparametric MRI    Operative Findings:  See operative note below      Complications:   None    Procedure and Technique:  I saw the patient in the holding area discussed the procedure as proposed step-by-step discussed post biopsy instructions in detail discussed potential risks and complications as outlined in history and physical which I performed at bedside.  Patient expressed understanding provided verbal and signed informed consent was taken to the operating room placed in dorsolithotomy position on the operating table after intravenous sedation was induced and after appropriate timeout.  He was prepped and draped in usual sterile fashion and then lidocaine 2% with epinephrine was used to anesthetize perineal skin and immediate subcutaneous tissue.  A condom cover lubricated transrectal ultrasound probe was placed per anus into the rectal vault with transverse and sagittal imaging of the prostate and seminal vesicles revealing normal seminal vesicles a rather long prostate with scattered calcifications and transitional zone.  No hypoechoic peripheral zone lesions were identified.  Lidocaine 2% with epinephrine was injected at the apices of the prostate on the left and right sides and then precision point apparatus was used for transperineal biopsies.  A transverse ultrasound sweep was provided for MRI fusion matching.  Core biopsies were obtained using MR fusion in the region of interest located anteriorly.  After 5 core biopsies obtained in the region of interest regional biopsies were obtained obtaining 2 cores per region for another 24 biopsies.  Total number of cores were 29.  After completion of the precision point biopsies pressure was held on the prostate transrectally using the transrectal ultrasound probe which was then removed atraumatically.  There was no bleeding identified the patient  recovered uneventfully.  The patient will follow-up with his urologist Dr. Smith in approximately 1 to 2 weeks to discuss biopsy results and future care.  There were no complications   I was present for the entire procedure. and I was present for all critical portions of the procedure.    Patient Disposition:  PACU         SIGNATURE: Foreign Gill MD  DATE: July 17, 2024  TIME: 9:40 AM

## 2024-07-17 NOTE — ANESTHESIA PREPROCEDURE EVALUATION
"Procedure:  TRANSPERINEAL MRI FUSION  BIOPSY PROSTATE (Perineum)    Relevant Problems   CARDIO   (+) Coronary artery disease involving native coronary artery of native heart without angina pectoris   (+) Mild hyperlipidemia      GI/HEPATIC   (+) GERD (gastroesophageal reflux disease)      MUSCULOSKELETAL   (+) Chondromalacia of knee      PULMONARY   (+) SOB (shortness of breath)             Anesthesia Plan  ASA Score-     Anesthesia Type-          Additional Monitors:     Airway Plan:     Comment: Pt with 2 vaso-vagal episodes while recovering in PACU, 1 episode in the cardiac ICU s/p hernia surgery (5/2019). Pt states \"I am unsure if I went into cardiac arrest.\" Pt was monitor overnight in the cardiac ICU then discharged home.  EKG SB.       Plan Factors-    Induction-     Postoperative Plan-         Informed Consent-       "
Procedure:  TRANSPERINEAL MRI FUSION  BIOPSY PROSTATE (Perineum)    Relevant Problems   ANESTHESIA  Bradycardia/arrest after hernia surgery many years ago in [place]. No issues with anesthetics prior to hernia surgery. Propofol allergy per chart.       CARDIO  Can get lightheaded after standing up at times, denies syncope.  Denies palpitations.    (+) Coronary artery disease involving native coronary artery of native heart without angina pectoris   (+) Mild hyperlipidemia      ENDO (within normal limits)      GI/HEPATIC   (+) GERD (gastroesophageal reflux disease)      MUSCULOSKELETAL   (+) Chondromalacia of knee      PULMONARY   (+) SOB (shortness of breath) (Exercises frequently, low resting HR. )        Physical Exam    Airway  Comment: Cspine stiffness in left/right direction   Mallampati score: II  TM Distance: >3 FB  Neck ROM: full     Dental   No notable dental hx     Cardiovascular  Cardiovascular exam normal    Pulmonary  Pulmonary exam normal     Other Findings        Anesthesia Plan  ASA Score- 3     Anesthesia Type- general with ASA Monitors.         Additional Monitors:     Airway Plan:     Comment: Patient prefers to avoid propofol due to prior bradycardic arrest after hernia surgery with propofol. .       Plan Factors-    Chart reviewed.    Patient summary reviewed.                  Induction- intravenous.    Postoperative Plan-         Informed Consent- Anesthetic plan and risks discussed with patient.          
Adequate: hears normal conversation without difficulty

## 2024-07-17 NOTE — ANESTHESIA POSTPROCEDURE EVALUATION
"Post-Op Assessment Note    CV Status:  Stable    Pain management: adequate       Mental Status:  Alert and awake   Hydration Status:  Euvolemic   PONV Controlled:  Controlled   Airway Patency:  Patent     Post Op Vitals Reviewed: Yes    No anethesia notable event occurred.    Staff: Anesthesiologist, CRNA               BP      Temp      Pulse     Resp      SpO2      /70   Pulse (!) 51   Temp (!) 97.1 °F (36.2 °C) (Temporal)   Resp 14   Ht 5' 11\" (1.803 m)   Wt 97.6 kg (215 lb 2.7 oz)   SpO2 99%   BMI 30.01 kg/m²     "

## 2024-07-19 ENCOUNTER — TELEPHONE (OUTPATIENT)
Dept: UROLOGY | Facility: CLINIC | Age: 74
End: 2024-07-19

## 2024-07-19 PROCEDURE — G0416 PROSTATE BIOPSY, ANY MTHD: HCPCS | Performed by: STUDENT IN AN ORGANIZED HEALTH CARE EDUCATION/TRAINING PROGRAM

## 2024-07-19 PROCEDURE — 88344 IMHCHEM/IMCYTCHM EA MLT ANTB: CPT | Performed by: STUDENT IN AN ORGANIZED HEALTH CARE EDUCATION/TRAINING PROGRAM

## 2024-07-19 NOTE — TELEPHONE ENCOUNTER
I called the patient to discuss his biopsy results.  I cannot get through to him despite calling multiple times.  Will discuss with him when I see him in clinic in 10 days   Other (Free Text): No clinical residual Note Text (......Xxx Chief Complaint.): This diagnosis correlates with the Detail Level: Simple

## 2024-07-22 ENCOUNTER — TELEPHONE (OUTPATIENT)
Dept: FAMILY MEDICINE CLINIC | Facility: CLINIC | Age: 74
End: 2024-07-22

## 2024-07-22 ENCOUNTER — TELEPHONE (OUTPATIENT)
Age: 74
End: 2024-07-22

## 2024-07-22 DIAGNOSIS — G47.00 INSOMNIA, UNSPECIFIED TYPE: ICD-10-CM

## 2024-07-22 RX ORDER — ZOLPIDEM TARTRATE 5 MG/1
5 TABLET ORAL
Qty: 30 TABLET | Refills: 3 | Status: SHIPPED | OUTPATIENT
Start: 2024-07-22

## 2024-07-22 NOTE — TELEPHONE ENCOUNTER
PA for zolpidem (AMBIEN) 5 mg tablet     Submitted via    [x]CMM-KEY [id number]  []Surescripts-Case ID #   []Faxed to plan   []Other website   []Phone call Case ID #     Office notes sent, clinical questions answered. Awaiting determination    Turnaround time for your insurance to make a decision on your Prior Authorization can take 7-21 business days.

## 2024-07-23 NOTE — TELEPHONE ENCOUNTER
PA for Zolpidem 5mg Approved     Date(s) approved 7/8/24 until further notice    Case #[id number]    Patient advised by          [x] [employer] Message  [] Phone call   []LMOM  []L/M to call office as no active Communication consent on file  []Unable to leave detailed message as VM not approved on Communication consent       Pharmacy advised by    [x]Fax  []Phone call    Approval letter scanned into Media Yes

## 2024-07-24 ENCOUNTER — TELEPHONE (OUTPATIENT)
Dept: UROLOGY | Facility: CLINIC | Age: 74
End: 2024-07-24

## 2024-07-24 NOTE — TELEPHONE ENCOUNTER
I called the pt and relayed his biopsy showing high volume of GG1 and GG2.  I recommend genetic testing. We discussed treatment vs surveillance.  We will see him in a few days and discuss further in person.    ----- Message from Foreign Gill MD sent at 7/19/2024 10:16 AM EDT -----    ----- Message -----  From: Lab, Background User  Sent: 7/19/2024   9:27 AM EDT  To: Foreign Gill MD

## 2024-07-26 ENCOUNTER — TELEPHONE (OUTPATIENT)
Age: 74
End: 2024-07-26

## 2024-07-26 NOTE — TELEPHONE ENCOUNTER
Called patient and leave a detailed message with provider response and also I send a MyChart message

## 2024-07-26 NOTE — TELEPHONE ENCOUNTER
Patient called to request referrals to see opthalmologic and a cardiologist before he goes for the surgical procedure for cancer . Patient was wondering if Dr Eric could provide some recommendations .  Please advise

## 2024-07-26 NOTE — TELEPHONE ENCOUNTER
For ophthalmology I would recommend [facility].  He may request appointment with either [facility], or [facility].    For cardiology [facility] has several in the group but he may want to see someone such as Laith Amador,or Manjit, or Chris

## 2024-07-29 ENCOUNTER — OFFICE VISIT (OUTPATIENT)
Dept: UROLOGY | Facility: AMBULATORY SURGERY CENTER | Age: 74
End: 2024-07-29
Payer: MEDICARE

## 2024-07-29 ENCOUNTER — PATIENT OUTREACH (OUTPATIENT)
Dept: HEMATOLOGY ONCOLOGY | Facility: CLINIC | Age: 74
End: 2024-07-29

## 2024-07-29 ENCOUNTER — DOCUMENTATION (OUTPATIENT)
Dept: HEMATOLOGY ONCOLOGY | Facility: CLINIC | Age: 74
End: 2024-07-29

## 2024-07-29 VITALS
SYSTOLIC BLOOD PRESSURE: 130 MMHG | DIASTOLIC BLOOD PRESSURE: 82 MMHG | HEIGHT: 71 IN | BODY MASS INDEX: 30.1 KG/M2 | WEIGHT: 215 LBS

## 2024-07-29 DIAGNOSIS — C61 PROSTATE CANCER (HCC): Primary | ICD-10-CM

## 2024-07-29 PROCEDURE — 99214 OFFICE O/P EST MOD 30 MIN: CPT | Performed by: UROLOGY

## 2024-07-29 NOTE — PROGRESS NOTES
Oncology Nurse Navigator  Intake received/ Chart reviewed for work up completed     Urologist:      Dr. morris      Imaging completed:    completed at [facility]  [] CT AP   [] Bone Scan   [] PSMA PET   [x] MRI prostate    Pathology completed:  prostate at [facility]    All records needed are in patients chart. No records retrieval needed at this time.        PSA Date:        Lab Results   Component Value Date    PSA 8.44 (H) 02/27/2024    PSA 5.9 (H) 02/10/2023

## 2024-07-29 NOTE — PROGRESS NOTES
Assessment/Plan:    Prostate cancer (HCC)  The patient has a new diagnosis of high-volume intermediate risk favorable prostate cancer.  We discussed the risk stratification of prostate cancer based on PSA, biopsy results and exam. We then discussed the roles for active surveillance versus surgery versus radiation.  We discussed how active surveillance is performed including repeat PSA every 6 months, the use of MRI at baseline and then annually and repeat biopsy based on PSA and MRI results.  We discussed surgery in detail including robot assisted laparoscopic surgery with the role for nerve-sparing and pelvic lymph node dissection. We also discussed the risks of surgery to include rectal injury erectile dysfunction prolonged incontinence injury to the bowel bladder or nerves and need for further surgeries.  The patient was given opportunity to ask questions about each treatment modality. He was offered a referral to Radiation Oncology.    We ordered [employer] genetic testing which has not yet returned.  Will check to make sure this is in progress.  The pt is amenable to discussing treatment with radiation oncology and I have replaced a referral.  Overall I do somewhat favor treatment in him given his high volume of cancer.  They do not seem to be concerned about the potential for loss of erectile function.  My main concern for considering surgery is his avid bike riding which may be hampered by the leakage associated with prostatectomy.  If they chose surgery they would opt to do this in the late fall (Nov 2024) when they are taking a break from bike riding but I told him there still could be leakage issues when they restart in the spring 2025.  I will see him back in 3 weeks after decipher results have returned and he meets with radiation oncology.            Subjective:      Patient ID: Juan Abdul is a 73 y.o. male.    HPI    73-year-old male with intermediate risk prostate cancer.     The patient had a PSA of  8.4 in 2024 from 5.9 in 2023.  No prior values for comparison.  No family history of prostate cancer.  MRI showed a PI-RADS 4 lesion and he underwent a fusion biopsy showing a high-volume GG2 + GG1 prostate cancer in 9 out of 12 locations with two of GG2.     The patient is an avid bike rider logging 100s of miles per year with multiple cross-country biking trips.  However he did somewhat slow down his biking activity prior to his most recent PSA.     He has a history of vascular disease with CABG and had complications from propofol with significant drop in blood pressure during subsequent hernia repair.    Final Diagnosis   A. Prostate, RIGHT ANTERIOR MEDIAL, biopsy:  -Prostatic adenocarcinoma, involving (2) of (2) cores:              - Sebastián score 3 + 3 = 6 (0 % grade 4), Prognostic Grade Group 1 involving 30 % (discontinuous) of 1 core.              - Sebastián score 3 + 3 = 6 (0 % grade 4), Prognostic Grade Group 1 involving 30 % (discontinuous) of 1 core.       B. Prostate, Right Anterior lateral, biopsy:  -Prostatic adenocarcinoma, involving (2) of (2) cores:              - [place] score 3 + 3 = 6 (0 % grade 4), Prognostic Grade Group 1 involving 40 % (discontinuous) of 1 core.              - [place] score 3 + 3 = 6 (0 % grade 4), Prognostic Grade Group 1 involving 50 % of 1 core.       C. Prostate, right base, biopsy:  - Benign prostatic tissue.       D. Prostate, right posterior medial, biopsy:  - Benign prostatic tissue.        E. Prostate, Left base, biopsy:  -Prostatic adenocarcinoma, involving (1) of (2) cores:              - Sebastián score 3 + 4 = 7 (20 % grade 4), Prognostic Grade Group 2 involving 45 % of 1 core.     F. Prostate, left posterior medial, biopsy:  -Prostatic adenocarcinoma, involving (2) of (2) cores:              - Sebastián score 3 + 4 = 7 (40 % grade 4), Prognostic Grade Group 2 involving 70 % of 1 core.              - Sebastián score 3 + 4 = 7 (40 % grade 4), Prognostic Grade Group 2  involving 25 % of 1 core.       G. Prostate, Left posterior lateral, biopsy:  -Prostatic adenocarcinoma, involving (2) of (2) cores:              - Sebastián score 3 + 4 = 7 (30 % grade 4), Prognostic Grade Group 2 involving 95 % of 1 core.              - Sebastián score 3 + 4 = 7 (20 % grade 4), Prognostic Grade Group 2 involving 95 % of 1 core.       H. Prostate, left anterior lateral, biopsy:  -Prostatic adenocarcinoma, involving (1) of (2) cores:              - Sebastián score 3 + 3 = 6 (0 % grade 4), Prognostic Grade Group 1 involving 20 % of 1 core.              - Sebastián score 3 + 3 = 6 (0 % grade 4), Prognostic Grade Group 1 involving 5 % of 1 core.       I. Prostate, Left Anterior Medial, biopsy:  - Benign prostatic tissue.        J. Prostate, Right posterior lateral, biopsy:  -Prostatic adenocarcinoma, involving (1) of (2) cores:              - Stuart score 3 + 3 = 6 (0 % grade 4), Prognostic Grade Group 1 involving 5 % of 1 core.  - Benign prostatic tissue is second core.       K. Prostate, REGION OF INTEREST, biopsy:  -Prostatic adenocarcinoma, involving (2) of (4) cores:              - Stuart score 3 + 3 = 6 (0 % grade 4), Prognostic Grade Group 1 involving 5 % of 1 core.                - Stuart score 3 + 3 = 6 (0 % grade 4), Prognostic Grade Group 1 involving 5 % of 1 core.    - High-grade prostatic intraepithelial neoplasia involving one of the 4 cores.       L. Prostate, RIGHT TRANSITIONAL ZONE, biopsy:  -Prostatic adenocarcinoma, involving (2) of (2) cores:              - Stuart score 3 + 3 = 6 (0 % grade 4), Prognostic Grade Group 1 involving 45 % of 1 core.              - Stuart score 3 + 3 = 6 (0 % grade 4), Prognostic Grade Group 1 involving 25 % of 1 core.       M. Prostate, LEFT TRANSITIOMAL ZONE, biopsy:  -Prostatic adenocarcinoma, involving (1) of (2) cores:              - Stuart score 3 + 3 = 6 (0 % grade 4), Prognostic Grade Group 1 involving 20 % of 1 core.  - Benign prostatic tissue is  second core.     Past Surgical History:   Procedure Laterality Date    ANKLE SURGERY Right     COLONOSCOPY      CORONARY ARTERY BYPASS GRAFT  2 years ago    5/2019    EGD      HERNIA REPAIR      NE PROSTATE NEEDLE BIOPSY ANY APPROACH N/A 7/17/2024    Procedure: TRANSPERINEAL MRI FUSION  BIOPSY PROSTATE;  Surgeon: Foreign Gill MD;  Location: [facility];  Service: Urology    WISDOM TOOTH EXTRACTION          Past Medical History:   Diagnosis Date    Anesthesia complication     vaso-vagal  episodes x 3 in pacu monitored in cardiac ICU overnight    Colon polyp     GERD (gastroesophageal reflux disease)     Hx of CABG     Hyperlipidemia         AUA SYMPTOM SCORE      Flowsheet Row Most Recent Value   AUA SYMPTOM SCORE    How often have you had a sensation of not emptying your bladder completely after you finished urinating? 1 (P)     How often have you had to urinate again less than two hours after you finished urinating? 1 (P)     How often have you found you stopped and started again several times when you urinate? 0 (P)     How often have you found it difficult to postpone urination? 1 (P)     How often have you had a weak urinary stream? 1 (P)     How often have you had to push or strain to begin urination? 0 (P)     How many times did you most typically get up to urinate from the time you went to bed at night until the time you got up in the morning? 1 (P)     Quality of Life: If you were to spend the rest of your life with your urinary condition just the way it is now, how would you feel about that? 2 (P)     AUA SYMPTOM SCORE 5 (P)               Review of Systems   Constitutional:  Negative for chills and fever.   HENT:  Negative for ear pain and sore throat.    Eyes:  Negative for pain and visual disturbance.   Respiratory:  Negative for cough and shortness of breath.    Cardiovascular:  Negative for chest pain and palpitations.   Gastrointestinal:  Negative for abdominal pain and vomiting.   Genitourinary:   "Negative for dysuria and hematuria.   Musculoskeletal:  Negative for arthralgias and back pain.   Skin:  Negative for color change and rash.   Neurological:  Negative for seizures and syncope.   All other systems reviewed and are negative.        Objective:      /82 (BP Location: Left arm, Patient Position: Sitting, Cuff Size: Adult)   Ht 5' 11\" (1.803 m)   Wt 97.5 kg (215 lb)   BMI 29.99 kg/m²     Lab Results   Component Value Date    PSA 8.44 (H) 02/27/2024    PSA 5.9 (H) 02/10/2023          Physical Exam  Vitals reviewed.   Constitutional:       Appearance: Normal appearance. He is normal weight.   HENT:      Head: Normocephalic and atraumatic.   Eyes:      Pupils: Pupils are equal, round, and reactive to light.   Abdominal:      General: Abdomen is flat.   Neurological:      General: No focal deficit present.      Mental Status: He is alert and oriented to person, place, and time.   Psychiatric:         Mood and Affect: Mood normal.         Thought Content: Thought content normal.           Orders  Orders Placed This Encounter   Procedures    PSA Total, Diagnostic     Standing Status:   Future     Standing Expiration Date:   7/29/2025    Ambulatory referral to Radiation Oncology     Standing Status:   Future     Standing Expiration Date:   7/29/2025     Referral Priority:   Routine     Referral Type:   Consult - AMB     Referral Reason:   Specialty Services Required     Requested Specialty:   Radiation Oncology     Number of Visits Requested:   1     Expiration Date:   7/29/2025     "

## 2024-07-29 NOTE — ASSESSMENT & PLAN NOTE
The patient has a new diagnosis of high-volume intermediate risk favorable prostate cancer.  We discussed the risk stratification of prostate cancer based on PSA, biopsy results and exam. We then discussed the roles for active surveillance versus surgery versus radiation.  We discussed how active surveillance is performed including repeat PSA every 6 months, the use of MRI at baseline and then annually and repeat biopsy based on PSA and MRI results.  We discussed surgery in detail including robot assisted laparoscopic surgery with the role for nerve-sparing and pelvic lymph node dissection. We also discussed the risks of surgery to include rectal injury erectile dysfunction prolonged incontinence injury to the bowel bladder or nerves and need for further surgeries.  The patient was given opportunity to ask questions about each treatment modality. He was offered a referral to Radiation Oncology.    We ordered [employer] genetic testing which has not yet returned.  Will check to make sure this is in progress.  The pt is amenable to discussing treatment with radiation oncology and I have replaced a referral.  Overall I do somewhat favor treatment in him given his high volume of cancer.  They do not seem to be concerned about the potential for loss of erectile function.  My main concern for considering surgery is his avid bike riding which may be hampered by the leakage associated with prostatectomy.  If they chose surgery they would opt to do this in the late fall (Nov 2024) when they are taking a break from bike riding but I told him there still could be leakage issues when they restart in the spring 2025.  I will see him back in 3 weeks after decipher results have returned and he meets with radiation oncology.

## 2024-07-29 NOTE — Clinical Note
Can we check to make sure this patient's decipher results are in progress?  If not please get them send out.  Clinical stage T1c.

## 2024-07-30 ENCOUNTER — TELEPHONE (OUTPATIENT)
Dept: UROLOGY | Facility: AMBULATORY SURGERY CENTER | Age: 74
End: 2024-07-30

## 2024-07-30 NOTE — TELEPHONE ENCOUNTER
Per Dr. Smith's note:  patricia smith in 3 weeks (overbook).    Starting the week of 8/26    I did not find anything in the [place] office or the [place] office.    Please assist

## 2024-07-31 NOTE — TELEPHONE ENCOUNTER
Duplicate encounter created, please see telephone encounter from 7/22/2024 regarding Zolpidem 5mg PA status. Please review patient's chart to see if there is already an encounter regarding the medication in question and to document anything regarding this medication in regards to anything regarding the authorization process etc before creating another encounter Thank You.

## 2024-07-31 NOTE — TELEPHONE ENCOUNTER
LVM to let PT know of his apt on 8/29 at 1:00 with Dr. Smith in the [place] office.  CB# was given if he needed to reschedule

## 2024-08-01 ENCOUNTER — PATIENT OUTREACH (OUTPATIENT)
Dept: HEMATOLOGY ONCOLOGY | Facility: CLINIC | Age: 74
End: 2024-08-01

## 2024-08-01 NOTE — PROGRESS NOTES
Outreach to pt and left voicemail introducing myself and role as Nurse Navigator to assist with coordination of cancer care, preparation for upcoming appointment, be a point of contact prior to oncology consult,  provide support and connect with available resources.  Provided contact information, reviewed upcoming appts and requested call back.    Future Appointments   Date Time Provider Department Center   8/2/2024  1:00 PM Aram Agrawal MD [facility] Rad Onc [facility]   8/29/2024  1:00 PM Juan J Smith MD [facility]   9/16/2024  1:40 PM Jose Amador MD [facility]

## 2024-08-01 NOTE — PROGRESS NOTES
Discussed radiation oncology consultation, surgery options or a watch and wait stance. Patient is very knowledgeable and doing appropriate research.     Oncology Nurse Navigator made call to patient due to referral to provider within [facility]. I spoke with patient about my role as an Oncology Nurse Navigator. I explained how to reach the office for any routine or urgent matters and the availability of patient navigation for non urgent matters. Explained oncology navigation is here to help patients through their journey and to offer assistance with any barriers to care. As a team, we strive to be patient advocates and help navigate healthcare system. Patient aware that medical oncology nursing will be primary contact once consult is complete and patient navigator will continue to offer support through entire journey. Conversation is based on evidence based care to optimize patient outcomes. Emotional support provided throughout conversation.       Evaluated for any barriers to care.   Genetic testing not indicated at this time   Smoking status verified non smoker   Provided contact information for nurse navigator and patient navigator  Verified email for any medical release needs/electronic outreach  Discussed use of My Chart patient uses on regular basis    Answered questions to pt's satisfaction.  Reviewed upcoming appts. Provided support and provided direct contact information for any questions or concerns.       Future Appointments   Date Time Provider Department Center   8/2/2024  1:00 PM Aram Agrawal MD [facility] Rad Onc [facility]   8/29/2024  1:00 PM Juan J Smith MD [facility]   9/16/2024  1:40 PM Jose Amador MD [facility]

## 2024-08-02 ENCOUNTER — APPOINTMENT (OUTPATIENT)
Dept: LAB | Facility: CLINIC | Age: 74
End: 2024-08-02
Payer: MEDICARE

## 2024-08-02 ENCOUNTER — CONSULT (OUTPATIENT)
Dept: RADIATION ONCOLOGY | Facility: HOSPITAL | Age: 74
End: 2024-08-02
Attending: UROLOGY
Payer: MEDICARE

## 2024-08-02 VITALS — OXYGEN SATURATION: 95 % | TEMPERATURE: 97.6 F | HEART RATE: 56 BPM | RESPIRATION RATE: 18 BRPM

## 2024-08-02 DIAGNOSIS — C61 PROSTATE CANCER (HCC): ICD-10-CM

## 2024-08-02 LAB — PSA SERPL-MCNC: 8.89 NG/ML (ref 0–4)

## 2024-08-02 PROCEDURE — 36415 COLL VENOUS BLD VENIPUNCTURE: CPT

## 2024-08-02 PROCEDURE — 99211 OFF/OP EST MAY X REQ PHY/QHP: CPT | Performed by: RADIOLOGY

## 2024-08-02 PROCEDURE — 99205 OFFICE O/P NEW HI 60 MIN: CPT | Performed by: RADIOLOGY

## 2024-08-02 PROCEDURE — 84153 ASSAY OF PSA TOTAL: CPT

## 2024-08-02 NOTE — PROGRESS NOTES
Consultation - Radiation Oncology      Patient Name: Juan Abdul MRN:[id number] : 1950  Encounter: [id number]  Referring Provider: Juan J Smith MD    Cancer Staging   Prostate cancer (HCC)  Staging form: Prostate, AJCC 8th Edition  - Clinical: Stage IIB (cT1c, cN0, cM0, PSA: 8.9, Grade Group: 2) - Signed by Aram Agrawal MD on 2024  Prostate specific antigen (PSA) range: Less than 10  Sebastián score: 7  Histologic grading system: 5 grade system    ASSESSMENT & PLAN  Juan Abdul is a 73 y.o. male with recently diagnosed high-volume Shelby 3+4 equal 7 adenocarcinoma the prostate with a PSA of 8.89.  He was originally referred to urology secondary to the elevated PSA.  MRI of the prostate on 4/15/2024 revealed a category 4 lesion in the right anterior transitional zone in the mid gland.  There was no evidence of extraprostatic tumor, seminal vesicle invasion, pelvic lymphadenopathy, or pelvic osseous metastatic disease.  His calculated prostate volume was 34 cc.  On 2024 he underwent prostate biopsy.  24 cores were obtained in a standard fashion with 9 cores positive for [place] 3+3 = 6 and an additional 5 cores positive for Sebastián 3+4 = 7.  4 cores were taken from the region of interest with 2 of those cores containing Sebastián 3+3 = 6 disease.  Given that greater than 50% of the cores were positive, he would technically be categorized as having unfavorable intermediate risk disease.  A decipher test has been ordered and the results are pending.    At this point, I would agree with urology that I do not think he is a good candidate for active surveillance alone.  He seems to still be entertaining the possibility of robotic prostatectomy and is scheduled to undergo cardiac clearance.  If he opts against surgery, then he would be a candidate for definitive radiation +/- short course androgen deprivation therapy.  Various radiation options were discussed including moderately hypofractionated  intensity modulated radiation delivered over 5-1/2 weeks, stereotactic radiation, and interstitial prostate brachytherapy.  If his Decipher test returns as high risk, together with his volume of disease and otherwise excellent health, I would recommend moderate hypofractionated radiation directed at the prostate and seminal vesicles with a short course of androgen deprivation for a total of 6 months.  If his decipher test returns as low risk, I believe it would be appropriate to proceed with radiation alone.  If he decides for radiation, he will first require fiducial marker and rectal spacer placement.  The associated risks and toxicities of radiation were discussed with the patient and his wife in detail, including, but not limited to, fatigue, increased frequency of urination, dysuria, nocturia, hematuria, diarrhea, rectal bleeding, impotence, incontinence, dry ejaculation, and long-term radiation cystitis/proctitis.    At this point, the patient feels that he is still in the information gathering stage and would like to proceed with his cardiac clearance and receive his decipher results prior to making any final decisions.  He is scheduled to follow-up with urology once the decipher test has resulted.  He would contact our department should he ultimately decide to proceed with definitive radiation.      Thank you for the opportunity to participate in the care of this patient.    Aram Agrawal MD  Department of Radiation Oncology  [facility]    No orders of the defined types were placed in this encounter.    1. Prostate cancer (HCC)  Ambulatory referral to Radiation Oncology          Total Time Spent  56 minutes spent reviewing EMR in preparation for visit, with the patient, coordination with other providers, and documentation.    CHIEF COMPLAINT  Chief Complaint   Patient presents with    Consult     Radiation Oncology       History of Present Illness  Juan Franko 1950 is a 73  y.o. male referred by Dr. Smith for new diagnosis of high-volume intermediate risk favorable prostate cancer.    Patient presents with elevated PSA, 8.44 in Feb 2024. He underwent MRI prostate that showed PI-RADS 4 lesion. Biopsy showed 9 of 12 positive cores, GS 6 and GS 7 (3+4) prostate cancer. He has no family hx of prostate cancer. Other medical history includes vascular disease, CABG and hernia repair.   He presents today to discuss radiation treatment options.        PSA   Latest Ref Rng 0.000 - 4.000 ng/mL   2/10/2023 5.9 (H)    2/27/2024 8.44 (H)    8/2/2024 8.890 (H)         4/15/24 MRI prostate multiparametric   1. Suboptimal assessment of the posterior peripheral zone and bilateral seminal vesicles due to motion artifact and susceptibility artifact from air distended rectum.   2.  PI-RADSv2.1 Category 4 - High (clinically significant cancer is likely to be present). A PI-RADS 4 lesion in the right anterior transitional zone in the mid gland   2. No extraprostatic tumor, seminal vesicle invasion, pelvic lymphadenopathy, or pelvic osseous metastatic disease.   3. Calculated prostate volume of 34 cc.      7/17/24 Prostate biopsy  A. Prostate, RIGHT ANTERIOR MEDIAL, biopsy:  -Prostatic adenocarcinoma, involving (2) of (2) cores:              - Sebastián score 3 + 3 = 6 (0 % grade 4), Prognostic Grade Group 1 involving 30 % (discontinuous) of 1 core.              - [place] score 3 + 3 = 6 (0 % grade 4), Prognostic Grade Group 1 involving 30 % (discontinuous) of 1 core.       B. Prostate, Right Anterior lateral, biopsy:  -Prostatic adenocarcinoma, involving (2) of (2) cores:              - Sebastián score 3 + 3 = 6 (0 % grade 4), Prognostic Grade Group 1 involving 40 % (discontinuous) of 1 core.              - [place] score 3 + 3 = 6 (0 % grade 4), Prognostic Grade Group 1 involving 50 % of 1 core.       C. Prostate, right base, biopsy:  - Benign prostatic tissue.       D. Prostate, right posterior medial,  biopsy:  - Benign prostatic tissue.        E. Prostate, Left base, biopsy:  -Prostatic adenocarcinoma, involving (1) of (2) cores:              - Sebastián score 3 + 4 = 7 (20 % grade 4), Prognostic Grade Group 2 involving 45 % of 1 core.     F. Prostate, left posterior medial, biopsy:  -Prostatic adenocarcinoma, involving (2) of (2) cores:              - [place] score 3 + 4 = 7 (40 % grade 4), Prognostic Grade Group 2 involving 70 % of 1 core.              - Sebastián score 3 + 4 = 7 (40 % grade 4), Prognostic Grade Group 2 involving 25 % of 1 core.       G. Prostate, Left posterior lateral, biopsy:  -Prostatic adenocarcinoma, involving (2) of (2) cores:              - Sebast[place] score 3 + 4 = 7 (30 % grade 4), Prognostic Grade Group 2 involving 95 % of 1 core.              - [place] score 3 + 4 = 7 (20 % grade 4), Prognostic Grade Group 2 involving 95 % of 1 core.       H. Prostate, left anterior lateral, biopsy:  -Prostatic adenocarcinoma, involving (1) of (2) cores:              - [place] score 3 + 3 = 6 (0 % grade 4), Prognostic Grade Group 1 involving 20 % of 1 core.              - Sebastián score 3 + 3 = 6 (0 % grade 4), Prognostic Grade Group 1 involving 5 % of 1 core.       I. Prostate, Left Anterior Medial, biopsy:  - Benign prostatic tissue.        J. Prostate, Right posterior lateral, biopsy:  -Prostatic adenocarcinoma, involving (1) of (2) cores:              - [place] score 3 + 3 = 6 (0 % grade 4), Prognostic Grade Group 1 involving 5 % of 1 core.  - Benign prostatic tissue is second core.       K. Prostate, REGION OF INTEREST, biopsy:  -Prostatic adenocarcinoma, involving (2) of (4) cores:              - [place] score 3 + 3 = 6 (0 % grade 4), Prognostic Grade Group 1 involving 5 % of 1 core.                - Sebastián score 3 + 3 = 6 (0 % grade 4), Prognostic Grade Group 1 involving 5 % of 1 core.    - High-grade prostatic intraepithelial neoplasia involving one of the 4 cores.       L. Prostate, RIGHT  TRANSITIONAL ZONE, biopsy:  -Prostatic adenocarcinoma, involving (2) of (2) cores:              - Sebastián score 3 + 3 = 6 (0 % grade 4), Prognostic Grade Group 1 involving 45 % of 1 core.              - [place] score 3 + 3 = 6 (0 % grade 4), Prognostic Grade Group 1 involving 25 % of 1 core.       M. Prostate, LEFT TRANSITIOMAL ZONE, biopsy:  -Prostatic adenocarcinoma, involving (1) of (2) cores:              - Sebastián score 3 + 3 = 6 (0 % grade 4), Prognostic Grade Group 1 involving 20 % of 1 core.  - Benign prostatic tissue is second core.      7/29/24 Urology, Dr. Smith  New diagnosis of high-volume intermediate risk favorable prostate cancer.  Discussed the roles for active surveillance versus surgery versus radiation. Decipher testing ordered.   Placed referral to Rad Onc.  Follow-up in 3 weeks, after decipher testing is resulted and pt meets with rad onc.      Upcoming appts:  8/29/24 Urology       Oncology History   Prostate cancer (HCC)   2024 Initial Diagnosis    Prostate cancer (HCC)     7/17/2024 Biopsy    A. Prostate, RIGHT ANTERIOR MEDIAL, biopsy:  -Prostatic adenocarcinoma, involving (2) of (2) cores:              - [place] score 3 + 3 = 6 (0 % grade 4), Prognostic Grade Group 1 involving 30 % (discontinuous) of 1 core.              - [place] score 3 + 3 = 6 (0 % grade 4), Prognostic Grade Group 1 involving 30 % (discontinuous) of 1 core.       B. Prostate, Right Anterior lateral, biopsy:  -Prostatic adenocarcinoma, involving (2) of (2) cores:              - Sebastián score 3 + 3 = 6 (0 % grade 4), Prognostic Grade Group 1 involving 40 % (discontinuous) of 1 core.              - Sebastián score 3 + 3 = 6 (0 % grade 4), Prognostic Grade Group 1 involving 50 % of 1 core.       C. Prostate, right base, biopsy:  - Benign prostatic tissue.       D. Prostate, right posterior medial, biopsy:  - Benign prostatic tissue.        E. Prostate, Left base, biopsy:  -Prostatic adenocarcinoma, involving (1) of (2)  cores:              - [place] score 3 + 4 = 7 (20 % grade 4), Prognostic Grade Group 2 involving 45 % of 1 core.     F. Prostate, left posterior medial, biopsy:  -Prostatic adenocarcinoma, involving (2) of (2) cores:              - [place] score 3 + 4 = 7 (40 % grade 4), Prognostic Grade Group 2 involving 70 % of 1 core.              - Sebastián score 3 + 4 = 7 (40 % grade 4), Prognostic Grade Group 2 involving 25 % of 1 core.       G. Prostate, Left posterior lateral, biopsy:  -Prostatic adenocarcinoma, involving (2) of (2) cores:              - Sebasti[place] score 3 + 4 = 7 (30 % grade 4), Prognostic Grade Group 2 involving 95 % of 1 core.              - [place] score 3 + 4 = 7 (20 % grade 4), Prognostic Grade Group 2 involving 95 % of 1 core.       H. Prostate, left anterior lateral, biopsy:  -Prostatic adenocarcinoma, involving (1) of (2) cores:              - Sebastián score 3 + 3 = 6 (0 % grade 4), Prognostic Grade Group 1 involving 20 % of 1 core.              - Sebastián score 3 + 3 = 6 (0 % grade 4), Prognostic Grade Group 1 involving 5 % of 1 core.       I. Prostate, Left Anterior Medial, biopsy:  - Benign prostatic tissue.        J. Prostate, Right posterior lateral, biopsy:  -Prostatic adenocarcinoma, involving (1) of (2) cores:              - [place] score 3 + 3 = 6 (0 % grade 4), Prognostic Grade Group 1 involving 5 % of 1 core.  - Benign prostatic tissue is second core.       K. Prostate, REGION OF INTEREST, biopsy:  -Prostatic adenocarcinoma, involving (2) of (4) cores:              - Sebastián score 3 + 3 = 6 (0 % grade 4), Prognostic Grade Group 1 involving 5 % of 1 core.                - Sebasti[place] score 3 + 3 = 6 (0 % grade 4), Prognostic Grade Group 1 involving 5 % of 1 core.    - High-grade prostatic intraepithelial neoplasia involving one of the 4 cores.       L. Prostate, RIGHT TRANSITIONAL ZONE, biopsy:  -Prostatic adenocarcinoma, involving (2) of (2) cores:              - Sebastián score 3 + 3 = 6 (0 % grade  4), Prognostic Grade Group 1 involving 45 % of 1 core.              - Sebastián score 3 + 3 = 6 (0 % grade 4), Prognostic Grade Group 1 involving 25 % of 1 core.       M. Prostate, LEFT TRANSITIOMAL ZONE, biopsy:  -Prostatic adenocarcinoma, involving (1) of (2) cores:              - [place] score 3 + 3 = 6 (0 % grade 4), Prognostic Grade Group 1 involving 20 % of 1 core.  - Benign prostatic tissue is second core.     8/2/2024 -  Cancer Staged    Staging form: Prostate, AJCC 8th Edition  - Clinical: Stage IIB (cT1c, cN0, cM0, PSA: 8.9, Grade Group: 2) - Signed by Aram Agrawal MD on 8/2/2024  Prostate specific antigen (PSA) range: Less than 10  Sebastián score: 7  Histologic grading system: 5 grade system         Historical Information   Past Medical History:   Diagnosis Date    Anesthesia complication     vaso-vagal  episodes x 3 in pacu monitored in cardiac ICU overnight    Colon polyp     GERD (gastroesophageal reflux disease)     Hx of CABG     Hyperlipidemia     Prostate cancer (HCC)      Past Surgical History:   Procedure Laterality Date    ANKLE SURGERY Right     COLONOSCOPY      CORONARY ARTERY BYPASS GRAFT  2 years ago    5/2019    EGD      HERNIA REPAIR      VA PROSTATE NEEDLE BIOPSY ANY APPROACH N/A 7/17/2024    Procedure: TRANSPERINEAL MRI FUSION  BIOPSY PROSTATE;  Surgeon: Foreign Gill MD;  Location: [facility];  Service: Urology    WISDOM TOOTH EXTRACTION       Family History   Problem Relation Age of Onset    Stroke Brother     Cancer Brother      Social History   Social History     Substance and Sexual Activity   Alcohol Use Never     Social History     Substance and Sexual Activity   Drug Use Never     Social History     Tobacco Use   Smoking Status Never   Smokeless Tobacco Never     Meds/Allergies     Current Outpatient Medications:     atorvastatin (LIPITOR) 40 mg tablet, in the evening. Take before meals, Disp: , Rfl:     Cholecalciferol (VITAMIN D3 PO), Take by mouth, Disp: , Rfl:     FIBER  "PO, Take by mouth, Disp: , Rfl:     fluticasone (FLONASE) 50 mcg/act nasal spray, 2 sprays by Each Nare route daily, Disp: , Rfl:     omeprazole (PriLOSEC) 20 mg delayed release capsule, Take 20 mg by mouth, Disp: , Rfl:     Probiotic Product (PROBIOTIC PO), Take by mouth, Disp: , Rfl:     zolpidem (AMBIEN) 5 mg tablet, Take 1 tablet (5 mg total) by mouth daily at bedtime as needed for sleep, Disp: 30 tablet, Rfl: 3    ASPIRIN 81 PO, Take 81 mg by mouth in the morning (Patient not taking: Reported on 2024), Disp: , Rfl:   Allergies   Allergen Reactions    Propofol Other (See Comments)     Vaso-vagal reaction  x 3 pt monitored in cardiac ICU overnight       Pathology:  See above.    Review of Systems      OBJECTIVE:   Pulse 56   Temp 97.6 °F (36.4 °C) (Temporal)   Resp 18   SpO2 95%   Pain Assessment:  0  Performance Status: ECO - Asymptomatic    Physical Exam  General Appearance:  Alert, cooperative, no distress, appears stated age  Cardiovascular:  Extremities warm and well perfused  Lungs: Respirations unlabored, no cyanosis, able to speak in complete sentences without dyspnea.  Abdomen: Non-distended  Skin: No generalized rash or dermatitis  Neurologic: AAOx3, speech and cognition intact.    Portions of the record may have been created with voice recognition software.  Occasional wrong word or \"sound a like\" substitutions may have occurred due to the inherent limitations of voice recognition software.  Read the chart carefully and recognize, using context, where substitutions have occurred.  "

## 2024-08-02 NOTE — PROGRESS NOTES
Juan Abdul 1950 is a 73 y.o. male referred by Dr. Smith for new diagnosis of high-volume intermediate risk favorable prostate cancer.    Patient presents with elevated PSA, 8.44 in Feb 2024. He underwent MRI prostate that showed PI-RADS 4 lesion. Biopsy showed 9 of 12 positive cores, GS 6 and GS 7 (3+4) prostate cancer. He has no family hx of prostate cancer. Other medical history includes vascular disease, CABG and hernia repair.   He presents today to discuss radiation treatment options.        PSA   Latest Ref Rng 0.000 - 4.000 ng/mL   2/10/2023 5.9 (H)    2/27/2024 8.44 (H)    8/2/2024 8.890 (H)         4/15/24 MRI prostate multiparametric   1. Suboptimal assessment of the posterior peripheral zone and bilateral seminal vesicles due to motion artifact and susceptibility artifact from air distended rectum.   2.  PI-RADSv2.1 Category 4 - High (clinically significant cancer is likely to be present). A PI-RADS 4 lesion in the right anterior transitional zone in the mid gland   2. No extraprostatic tumor, seminal vesicle invasion, pelvic lymphadenopathy, or pelvic osseous metastatic disease.   3. Calculated prostate volume of 34 cc.      7/17/24 Prostate biopsy  A. Prostate, RIGHT ANTERIOR MEDIAL, biopsy:  -Prostatic adenocarcinoma, involving (2) of (2) cores:              - Sebastián score 3 + 3 = 6 (0 % grade 4), Prognostic Grade Group 1 involving 30 % (discontinuous) of 1 core.              - [place] score 3 + 3 = 6 (0 % grade 4), Prognostic Grade Group 1 involving 30 % (discontinuous) of 1 core.       B. Prostate, Right Anterior lateral, biopsy:  -Prostatic adenocarcinoma, involving (2) of (2) cores:              - [place] score 3 + 3 = 6 (0 % grade 4), Prognostic Grade Group 1 involving 40 % (discontinuous) of 1 core.              - [place] score 3 + 3 = 6 (0 % grade 4), Prognostic Grade Group 1 involving 50 % of 1 core.       C. Prostate, right base, biopsy:  - Benign prostatic tissue.       D. Prostate,  right posterior medial, biopsy:  - Benign prostatic tissue.        E. Prostate, Left base, biopsy:  -Prostatic adenocarcinoma, involving (1) of (2) cores:              - Sebast[place] score 3 + 4 = 7 (20 % grade 4), Prognostic Grade Group 2 involving 45 % of 1 core.     F. Prostate, left posterior medial, biopsy:  -Prostatic adenocarcinoma, involving (2) of (2) cores:              - [place] score 3 + 4 = 7 (40 % grade 4), Prognostic Grade Group 2 involving 70 % of 1 core.              - [place] score 3 + 4 = 7 (40 % grade 4), Prognostic Grade Group 2 involving 25 % of 1 core.       G. Prostate, Left posterior lateral, biopsy:  -Prostatic adenocarcinoma, involving (2) of (2) cores:              - [place] score 3 + 4 = 7 (30 % grade 4), Prognostic Grade Group 2 involving 95 % of 1 core.              - [place] score 3 + 4 = 7 (20 % grade 4), Prognostic Grade Group 2 involving 95 % of 1 core.       H. Prostate, left anterior lateral, biopsy:  -Prostatic adenocarcinoma, involving (1) of (2) cores:              - [place] score 3 + 3 = 6 (0 % grade 4), Prognostic Grade Group 1 involving 20 % of 1 core.              - [place] score 3 + 3 = 6 (0 % grade 4), Prognostic Grade Group 1 involving 5 % of 1 core.       I. Prostate, Left Anterior Medial, biopsy:  - Benign prostatic tissue.        J. Prostate, Right posterior lateral, biopsy:  -Prostatic adenocarcinoma, involving (1) of (2) cores:              - [place] score 3 + 3 = 6 (0 % grade 4), Prognostic Grade Group 1 involving 5 % of 1 core.  - Benign prostatic tissue is second core.       K. Prostate, REGION OF INTEREST, biopsy:  -Prostatic adenocarcinoma, involving (2) of (4) cores:              - [place] score 3 + 3 = 6 (0 % grade 4), Prognostic Grade Group 1 involving 5 % of 1 core.                - [place] score 3 + 3 = 6 (0 % grade 4), Prognostic Grade Group 1 involving 5 % of 1 core.    - High-grade prostatic intraepithelial neoplasia involving one of the 4 cores.       L.  Prostate, RIGHT TRANSITIONAL ZONE, biopsy:  -Prostatic adenocarcinoma, involving (2) of (2) cores:              - [place] score 3 + 3 = 6 (0 % grade 4), Prognostic Grade Group 1 involving 45 % of 1 core.              - [place] score 3 + 3 = 6 (0 % grade 4), Prognostic Grade Group 1 involving 25 % of 1 core.       M. Prostate, LEFT TRANSITIOMAL ZONE, biopsy:  -Prostatic adenocarcinoma, involving (1) of (2) cores:              - [place] score 3 + 3 = 6 (0 % grade 4), Prognostic Grade Group 1 involving 20 % of 1 core.  - Benign prostatic tissue is second core.      7/29/24 Urology, Dr. Smith  New diagnosis of high-volume intermediate risk favorable prostate cancer.  Discussed the roles for active surveillance versus surgery versus radiation. Decipher testing ordered.   Placed referral to Rad Onc.  Follow-up in 3 weeks, after decipher testing is resulted and pt meets with rad onc.      Upcoming appts:  8/29/24 Urology           Oncology History   Prostate cancer (HCC)   2024 Initial Diagnosis    Prostate cancer (HCC)     7/17/2024 Biopsy    A. Prostate, RIGHT ANTERIOR MEDIAL, biopsy:  -Prostatic adenocarcinoma, involving (2) of (2) cores:              - [place] score 3 + 3 = 6 (0 % grade 4), Prognostic Grade Group 1 involving 30 % (discontinuous) of 1 core.              - [place] score 3 + 3 = 6 (0 % grade 4), Prognostic Grade Group 1 involving 30 % (discontinuous) of 1 core.       B. Prostate, Right Anterior lateral, biopsy:  -Prostatic adenocarcinoma, involving (2) of (2) cores:              - [place] score 3 + 3 = 6 (0 % grade 4), Prognostic Grade Group 1 involving 40 % (discontinuous) of 1 core.              - [place] score 3 + 3 = 6 (0 % grade 4), Prognostic Grade Group 1 involving 50 % of 1 core.       C. Prostate, right base, biopsy:  - Benign prostatic tissue.       D. Prostate, right posterior medial, biopsy:  - Benign prostatic tissue.        E. Prostate, Left base, biopsy:  -Prostatic adenocarcinoma,  involving (1) of (2) cores:              - [place] score 3 + 4 = 7 (20 % grade 4), Prognostic Grade Group 2 involving 45 % of 1 core.     F. Prostate, left posterior medial, biopsy:  -Prostatic adenocarcinoma, involving (2) of (2) cores:              - [place] score 3 + 4 = 7 (40 % grade 4), Prognostic Grade Group 2 involving 70 % of 1 core.              - [place] score 3 + 4 = 7 (40 % grade 4), Prognostic Grade Group 2 involving 25 % of 1 core.       G. Prostate, Left posterior lateral, biopsy:  -Prostatic adenocarcinoma, involving (2) of (2) cores:              - [place] score 3 + 4 = 7 (30 % grade 4), Prognostic Grade Group 2 involving 95 % of 1 core.              - [place] score 3 + 4 = 7 (20 % grade 4), Prognostic Grade Group 2 involving 95 % of 1 core.       H. Prostate, left anterior lateral, biopsy:  -Prostatic adenocarcinoma, involving (1) of (2) cores:              - [place] score 3 + 3 = 6 (0 % grade 4), Prognostic Grade Group 1 involving 20 % of 1 core.              - [place] score 3 + 3 = 6 (0 % grade 4), Prognostic Grade Group 1 involving 5 % of 1 core.       I. Prostate, Left Anterior Medial, biopsy:  - Benign prostatic tissue.        J. Prostate, Right posterior lateral, biopsy:  -Prostatic adenocarcinoma, involving (1) of (2) cores:              - [place] score 3 + 3 = 6 (0 % grade 4), Prognostic Grade Group 1 involving 5 % of 1 core.  - Benign prostatic tissue is second core.       K. Prostate, REGION OF INTEREST, biopsy:  -Prostatic adenocarcinoma, involving (2) of (4) cores:              - [place] score 3 + 3 = 6 (0 % grade 4), Prognostic Grade Group 1 involving 5 % of 1 core.                - [place] score 3 + 3 = 6 (0 % grade 4), Prognostic Grade Group 1 involving 5 % of 1 core.    - High-grade prostatic intraepithelial neoplasia involving one of the 4 cores.       L. Prostate, RIGHT TRANSITIONAL ZONE, biopsy:  -Prostatic adenocarcinoma, involving (2) of (2) cores:              - [place] score  3 + 3 = 6 (0 % grade 4), Prognostic Grade Group 1 involving 45 % of 1 core.              - [place] score 3 + 3 = 6 (0 % grade 4), Prognostic Grade Group 1 involving 25 % of 1 core.       M. Prostate, LEFT TRANSITIOMAL ZONE, biopsy:  -Prostatic adenocarcinoma, involving (1) of (2) cores:              - [place] score 3 + 3 = 6 (0 % grade 4), Prognostic Grade Group 1 involving 20 % of 1 core.  - Benign prostatic tissue is second core.         Review of Systems:  Review of Systems   Constitutional: Negative.    HENT: Negative.     Eyes: Negative.    Respiratory: Negative.     Cardiovascular: Negative.    Gastrointestinal: Negative.    Endocrine: Negative.    Genitourinary:  Positive for frequency (occasional).        Slower stream   Musculoskeletal: Negative.    Skin: Negative.    Allergic/Immunologic: Negative.    Neurological: Negative.    Hematological: Negative.    Psychiatric/Behavioral: Negative.         Clinical Trial: no    IPSS Questionnaire (AUA-7):  Over the past month…    1)  How often have you had a sensation of not emptying your bladder completely after you finish urinating?  0 - Not at all   2)  How often have you had to urinate again less than two hours after you finished urinating? 2 - Less than half the time   3)  How often have you found you stopped and started again several times when you urinated?  0 - Not at all   4) How difficult have you found it to postpone urination?  0 - Not at all   5) How often have you had a weak urinary stream?  3 - About half the time   6) How often have you had to push or strain to begin urination?  0 - Not at all   7) How many times did you most typically get up to urinate from the time you went to bed until the time you got up in the morning?  0 - None   Total Score:  5       Pain assessment: 0    PSA   PSA   Latest Ref Rng 0.000 - 4.000 ng/mL   2/10/2023 5.9 (H)    2/27/2024 8.44 (H)    8/2/2024 8.890 (H)         PFT: n/a    Prior Radiation: no    Teaching: [facility]  radiation packet    MST completed     Implantable Devices (Port, pacemaker, pain stimulator): no    Hip Replacement: no    Health Maintenance   Topic Date Due    Hepatitis C Screening  Never done    BMI: Followup Plan  Never done    RSV Vaccine Age 60+ Years (1 - 1-dose 60+ series) Never done    Zoster Vaccine (2 of 3) 12/12/2014    Influenza Vaccine (1) 09/01/2024    Fall Risk  02/12/2025    Depression Screening  02/12/2025    Medicare Annual Wellness Visit (AWV)  02/12/2025    BMI: Adult  07/29/2025    Colorectal Cancer Screening  02/21/2026    Pneumococcal Vaccine: 65+ Years  Completed    COVID-19 Vaccine  Completed    RSV Vaccine age 0-20 Months  Aged Out    HIB Vaccine  Aged Out    IPV Vaccine  Aged Out    Hepatitis A Vaccine  Aged Out    Meningococcal ACWY Vaccine  Aged Out    HPV Vaccine  Aged Out       Past Medical History:   Diagnosis Date    Anesthesia complication     vaso-vagal  episodes x 3 in pacu monitored in cardiac ICU overnight    Colon polyp     GERD (gastroesophageal reflux disease)     Hx of CABG     Hyperlipidemia     Prostate cancer (HCC)        Past Surgical History:   Procedure Laterality Date    ANKLE SURGERY Right     COLONOSCOPY      CORONARY ARTERY BYPASS GRAFT  2 years ago    5/2019    EGD      HERNIA REPAIR      IN PROSTATE NEEDLE BIOPSY ANY APPROACH N/A 7/17/2024    Procedure: TRANSPERINEAL MRI FUSION  BIOPSY PROSTATE;  Surgeon: Foreign Gill MD;  Location: [facility];  Service: Urology    WISDOM TOOTH EXTRACTION         Family History   Problem Relation Age of Onset    Stroke Brother     Cancer Brother        Social History     Tobacco Use    Smoking status: Never    Smokeless tobacco: Never   Vaping Use    Vaping status: Never Used   Substance Use Topics    Alcohol use: Never    Drug use: Never          Current Outpatient Medications:     atorvastatin (LIPITOR) 40 mg tablet, in the evening. Take before meals, Disp: , Rfl:     Cholecalciferol (VITAMIN D3 PO), Take by mouth,  Disp: , Rfl:     FIBER PO, Take by mouth, Disp: , Rfl:     fluticasone (FLONASE) 50 mcg/act nasal spray, 2 sprays by Each Nare route daily, Disp: , Rfl:     omeprazole (PriLOSEC) 20 mg delayed release capsule, Take 20 mg by mouth, Disp: , Rfl:     Probiotic Product (PROBIOTIC PO), Take by mouth, Disp: , Rfl:     zolpidem (AMBIEN) 5 mg tablet, Take 1 tablet (5 mg total) by mouth daily at bedtime as needed for sleep, Disp: 30 tablet, Rfl: 3    ASPIRIN 81 PO, Take 81 mg by mouth in the morning (Patient not taking: Reported on 8/2/2024), Disp: , Rfl:     Allergies   Allergen Reactions    Propofol Other (See Comments)     Vaso-vagal reaction  x 3 pt monitored in cardiac ICU overnight        Vitals:    08/02/24 1301   Pulse: 56   Resp: 18   Temp: 97.6 °F (36.4 °C)   TempSrc: Temporal   SpO2: 95%

## 2024-08-14 ENCOUNTER — TELEPHONE (OUTPATIENT)
Age: 74
End: 2024-08-14

## 2024-08-14 NOTE — TELEPHONE ENCOUNTER
Rupert Eric    Per chart review last office visit was on 02/12/2024 for Medicare Annual Wellness Visit.   Please review and advise, Thank you

## 2024-08-14 NOTE — TELEPHONE ENCOUNTER
Patient had called in stating that his sleep aid is not effective, and is only getting 6 hours of sleep throughout the night.     The patient was hoping maybe there was something that Dr. Eric could suggest that would work better, like a time release agent.     Patient will be traveling all next week, and was hoping he could get a response by dr. Eric before the end of the week.     Please have someone advise back out to the patient in regards  to any additional information, or questions that are needed.

## 2024-08-15 DIAGNOSIS — G47.00 INSOMNIA, UNSPECIFIED TYPE: Primary | ICD-10-CM

## 2024-08-15 RX ORDER — ZOLPIDEM TARTRATE 6.25 MG/1
6.25 TABLET, FILM COATED, EXTENDED RELEASE ORAL
Qty: 30 TABLET | Refills: 1 | Status: SHIPPED | OUTPATIENT
Start: 2024-08-15

## 2024-08-15 NOTE — TELEPHONE ENCOUNTER
I sent in prescription for Ambien 6.25 mg continuous release tablet to use at bedtime.  Hopefully this would be more effective for his sleep

## 2024-08-15 NOTE — TELEPHONE ENCOUNTER
"Called patient in regards of his Rx sent to his pharmacy. Patient states \" Thank you so much\"   "

## 2024-08-22 ENCOUNTER — LAB REQUISITION (OUTPATIENT)
Dept: LAB | Facility: HOSPITAL | Age: 74
End: 2024-08-22

## 2024-08-22 DIAGNOSIS — R97.20 ELEVATED PROSTATE SPECIFIC ANTIGEN (PSA): ICD-10-CM

## 2024-08-22 LAB — SCAN RESULT: NORMAL

## 2024-08-23 ENCOUNTER — TELEPHONE (OUTPATIENT)
Dept: UROLOGY | Facility: AMBULATORY SURGERY CENTER | Age: 74
End: 2024-08-23

## 2024-08-23 NOTE — TELEPHONE ENCOUNTER
I tried calling the patient discussed his decipher score which returned as high risk.  I got his voicemail.  I left a brief message saying his score was high and this corroborates my recommendation to consider treatment of his prostate cancer more strongly.  And I will see him in clinic in a few days to discuss in person

## 2024-08-27 ENCOUNTER — OFFICE VISIT (OUTPATIENT)
Dept: CARDIOLOGY CLINIC | Facility: CLINIC | Age: 74
End: 2024-08-27
Payer: MEDICARE

## 2024-08-27 VITALS
HEIGHT: 71 IN | DIASTOLIC BLOOD PRESSURE: 76 MMHG | SYSTOLIC BLOOD PRESSURE: 130 MMHG | BODY MASS INDEX: 29.96 KG/M2 | WEIGHT: 214 LBS | HEART RATE: 74 BPM | OXYGEN SATURATION: 97 %

## 2024-08-27 DIAGNOSIS — R06.02 SOB (SHORTNESS OF BREATH): ICD-10-CM

## 2024-08-27 DIAGNOSIS — I25.10 CORONARY ARTERY DISEASE INVOLVING NATIVE CORONARY ARTERY OF NATIVE HEART WITHOUT ANGINA PECTORIS: ICD-10-CM

## 2024-08-27 DIAGNOSIS — R07.9 CHEST PAIN, UNSPECIFIED TYPE: Primary | ICD-10-CM

## 2024-08-27 DIAGNOSIS — C61 PROSTATE CANCER (HCC): ICD-10-CM

## 2024-08-27 PROCEDURE — 99204 OFFICE O/P NEW MOD 45 MIN: CPT | Performed by: STUDENT IN AN ORGANIZED HEALTH CARE EDUCATION/TRAINING PROGRAM

## 2024-08-27 NOTE — PROGRESS NOTES
"Advanced Heart Failure/Pulmonary Hypertension Outpatient Note - Juan Abdul 73 y.o. male MRN: [id number]    @ Encounter: [id number]      Assessment:  73 y.o. male PMH and acute problems listed later in this note (a partial list may also be included within 'assessment' section) presents for consultation.  I first met Juan Abdul \"Red\" on 08/27/24.    Prior care in [place], new to [facility] cardiology as of 8/2024  Patient Active Problem List   Diagnosis    Coronary artery disease involving native coronary artery of native heart without angina pectoris    GERD (gastroesophageal reflux disease)    Mild hyperlipidemia    Presence of aortocoronary bypass graft    Chondromalacia of knee    SOB (shortness of breath)    Other insomnia    Elevated PSA    Prostate cancer (HCC)   CAD, S/p CABG 2019, had MI at that time.  2021 NST, I cannot see results 8/27/24, was done at OSH, pt reports found nothing of concern.  Never had 'weak heart muscle' per pt, no echos available 8/27/24.  He reports VZV events during hernia surgery [id number]  Cyclist, heavy exertion, just rode [place] to [place] 2024, cycles 20 miles or more per ride in recent weeks.  No toxic habits  Brother had MI young age, another brother had cabg      Today I have reviewed all pertinent labs/imaging/data including but not limited to:        Lab Units 07/03/24  1229 02/27/24  1125   CREATININE mg/dL 0.88 0.94         Lab Results   Component Value Date    K 4.2 07/03/2024     No results found for: \"HGBA1C\"  Lab Results   Component Value Date    YZS5HUJPCAWB 1.663 02/27/2024     Lab Results   Component Value Date    LDLCALC 48 02/27/2024     No results found for: \"BNP\"   No results found for: \"NTBNP\"       TODAY'S PLAN:     08/27/24  I am meeting patient for the first time today  Warm, euvolemic  + increasing CP, substernal and L sided, sometimes better with exertion/cycling sometimes not  +life stress recently  Off ASA recently 2/2 prostate procedures, newly restarted " last few days  No overt new SOB, has been doing less exercise recently and resting more    Minimal data available in epic prior to first meeting, cardiac test results do not actually populate in epic/[facility]    Cw asa and high intens statin    Taken off bblocker years ago 2/2 hypotension/dizziness    No Rx changers today    Check echo rest and exercise stress echo    Prostate ca surgery planning underway    Follow up:  With me in 4 weeks, after testing  In addition to follow up with their other medical providers    Studies:  Today I have reviewed all pertinent patient data/labs/imaging where available, including but not limited to the below studies. This includes my independent interpretation. Selected results may be displayed here but comprehensive listing is omitted for note clarity and can be found in the epic chart.    ECG.    Echo.    Stress.    Cath.    HPI:   73 y.o. male PMH and acute problems listed later in this note (a partial list may also be included within 'assessment' section) presents for consultation.  No new SOB/dizziness/palpitations/syncope.  No new unintentional weight changes.  No new leg swelling, PND, pillow orthopnea.  No new fevers, chills, cough, nausea, vomiting, diarrhea, dysuria.      ROS:  10 point ROS negative except as specified in HPI    Past Medical History:   Diagnosis Date    Anesthesia complication     vaso-vagal  episodes x 3 in pacu monitored in cardiac ICU overnight    Colon polyp     GERD (gastroesophageal reflux disease)     Hx of CABG     Hyperlipidemia     Prostate cancer (HCC)      Patient Active Problem List   Diagnosis    Coronary artery disease involving native coronary artery of native heart without angina pectoris    GERD (gastroesophageal reflux disease)    Mild hyperlipidemia    Presence of aortocoronary bypass graft    Chondromalacia of knee    SOB (shortness of breath)    Other insomnia    Elevated PSA    Prostate cancer (HCC)     No current  facility-administered medications for this visit.      Allergies   Allergen Reactions    Propofol Other (See Comments)     Vaso-vagal reaction  x 3 pt monitored in cardiac ICU overnight     Social History     Socioeconomic History    Marital status: /Civil Union     Spouse name: Not on file    Number of children: Not on file    Years of education: Not on file    Highest education level: Not on file   Occupational History    Not on file   Tobacco Use    Smoking status: Never    Smokeless tobacco: Never   Vaping Use    Vaping status: Never Used   Substance and Sexual Activity    Alcohol use: Never    Drug use: Never    Sexual activity: Yes   Other Topics Concern    Not on file   Social History Narrative    Not on file     Social Determinants of Health     Financial Resource Strain: Low Risk  (2/11/2024)    Overall Financial Resource Strain (CARDIA)     Difficulty of Paying Living Expenses: Not very hard   Food Insecurity: Not on file   Transportation Needs: No Transportation Needs (2/11/2024)    PRAPARE - Transportation     Lack of Transportation (Medical): No     Lack of Transportation (Non-Medical): No   Physical Activity: Sufficiently Active (11/23/2020)    [place] [place]    Exercise Vital Sign     Days of Exercise per Week: 7 days     Minutes of Exercise per Session: 90 min   Stress: Not on file   Social Connections: Unknown (3/14/2021)    [facility], [facility]    Social Connections     Conversations with friends/family/neighbors per week: Not on file   Intimate Partner Violence: Not on file   Housing Stability: Low Risk  (7/10/2021)    Received from [facility], [facility]    Housing Stability     Mortgage Payment Concerns?: Not on file     Number of Places Lived in the Last Year: Not on file     Unstable Housing?: Not on file     Family History   Problem Relation Age  "of Onset    Stroke Brother     Cancer Brother        Physical Exam:  Vitals:    08/27/24 1614   BP: 130/76   BP Location: Left arm   Patient Position: Sitting   Cuff Size: Standard   Pulse: 74   SpO2: 97%   Weight: 97.1 kg (214 lb)   Height: 5' 11\" (1.803 m)     Constitutional: NAD, non toxic  Ears/nose/mouth/throat: atraumatic  CV: RRR, nl S1S2, no murmurs/rubs/gallups, no JVD, no HJR  Resp: CTABL  GI: Soft, NTND  MSK: no swollen joints in exposed areas  Extr: No edema, warm LE  Pysche: Normal affect  Neuro: appropriate in conversation  Skin: dry and intact in exposed areas    Labs & Results:  Lab Results   Component Value Date    WBC 6.56 07/03/2024    HGB 15.2 07/03/2024    HCT 44.7 07/03/2024    MCV 96 07/03/2024     07/03/2024     Lab Results   Component Value Date    SODIUM 140 07/03/2024    K 4.2 07/03/2024     07/03/2024    CO2 28 07/03/2024    BUN 13 07/03/2024    CREATININE 0.88 07/03/2024    GLUC 87 07/03/2024    CALCIUM 9.1 07/03/2024       Counseling / Coordination of Care  Greater than 50% of total time was spent with the patient and / or family counseling and / or coordination of care. Discussion included diagnoses, most recent studies and any changes in treatment.    Thank you for the opportunity to participate in the care of this patient.    Jairon Mojica MD  Attending Physician  Advanced Heart Failure and Transplant Cardiology  [facility]  "

## 2024-08-28 ENCOUNTER — TELEPHONE (OUTPATIENT)
Age: 74
End: 2024-08-28

## 2024-08-28 NOTE — TELEPHONE ENCOUNTER
Medical release paper work faxed to # provided by patient. [phone] .Medical release also scanned into pts chart under media tab

## 2024-08-28 NOTE — TELEPHONE ENCOUNTER
Caller: Juan Abdul    Doctor: Dr Mojica    Reason for call:  Patient will be stopping by the office today to drop off a release of medical records request form.  Can you please help expedite this for the patient.    Thank you    Call back#:  [phone]

## 2024-08-29 ENCOUNTER — OFFICE VISIT (OUTPATIENT)
Dept: UROLOGY | Facility: AMBULATORY SURGERY CENTER | Age: 74
End: 2024-08-29
Payer: MEDICARE

## 2024-08-29 VITALS
HEART RATE: 53 BPM | BODY MASS INDEX: 29.85 KG/M2 | DIASTOLIC BLOOD PRESSURE: 82 MMHG | WEIGHT: 214 LBS | SYSTOLIC BLOOD PRESSURE: 118 MMHG | OXYGEN SATURATION: 99 %

## 2024-08-29 DIAGNOSIS — C61 PROSTATE CANCER (HCC): Primary | ICD-10-CM

## 2024-08-29 PROBLEM — R97.20 ELEVATED PSA: Status: RESOLVED | Noted: 2024-02-28 | Resolved: 2024-08-29

## 2024-08-29 PROCEDURE — 99214 OFFICE O/P EST MOD 30 MIN: CPT | Performed by: UROLOGY

## 2024-08-29 NOTE — PROGRESS NOTES
Assessment/Plan:    Prostate cancer (HCC)  The patient has high-volume intermediate risk unfavorable prostate cancer with a high decipher score.  Think he warrants treatment with surgery or radiation.  We had a long discussion regarding the risks and benefits of each again.  In the past they were not very concerned about the risk of erectile dysfunction.  He is concerned about the risk of leakage which is not insignificant in him given his avid bike riding.  We discussed the role for Retzius sparing prostatectomy which we do not offer here but can be done elsewhere.  I provided him several names of urologist to do a large volume of prostate cancer work at nearby academic centers.    He is thinking about radiation versus surgery further.  He will call us with his decision.  I have also written for a repeat PSA to be drawn in October to keep an eye on PSA dynamics.    We also discussed that if his cardiology workup necessitates a delay in his care for prostate cancer there could be a role for a Lupron shot.    We will await his call.          Subjective:      Patient ID: Juan Abdul is a 73 y.o. male.    HPI    73-year-old male with intermediate risk prostate cancer.     The patient had a PSA of 8.4 in February 2024 from 5.9 in 2023.  No prior values for comparison.  No family history of prostate cancer.  MRI showed a PI-RADS 4 lesion and he underwent a fusion biopsy showing a high-volume GG2 + GG1 prostate cancer in 9 out of 12 locations with 7 cores of GG 1 and 2 cores of GG2.  Repeat PSA in August 2024 came back at 8.9.    Decipher testing was performed and returned at 0.86 which is high risk.     He has met with Dr. Agrawal of radiation oncology to discuss the role for radiation therapy to treat his cancer with a plan to include ADT if his decipher score returned high risk (as it has).    The patient is an avid bike rider logging 100s of miles per year with multiple cross-country biking trips.  However he did  somewhat slow down his biking activity prior to his most recent PSA.     He has a history of vascular disease with CABG and had complications from propofol with significant drop in blood pressure during subsequent hernia repair.  He is currently undergoing a cardiology workup with an echo tomorrow another test soon to follow-up.     Final Diagnosis   A. Prostate, RIGHT ANTERIOR MEDIAL, biopsy:  -Prostatic adenocarcinoma, involving (2) of (2) cores:              - Sebastián score 3 + 3 = 6 (0 % grade 4), Prognostic Grade Group 1 involving 30 % (discontinuous) of 1 core.              - Sebastián score 3 + 3 = 6 (0 % grade 4), Prognostic Grade Group 1 involving 30 % (discontinuous) of 1 core.       B. Prostate, Right Anterior lateral, biopsy:  -Prostatic adenocarcinoma, involving (2) of (2) cores:              - Sebastián score 3 + 3 = 6 (0 % grade 4), Prognostic Grade Group 1 involving 40 % (discontinuous) of 1 core.              - Douglas score 3 + 3 = 6 (0 % grade 4), Prognostic Grade Group 1 involving 50 % of 1 core.       C. Prostate, right base, biopsy:  - Benign prostatic tissue.       D. Prostate, right posterior medial, biopsy:  - Benign prostatic tissue.        E. Prostate, Left base, biopsy:  -Prostatic adenocarcinoma, involving (1) of (2) cores:              - Douglas score 3 + 4 = 7 (20 % grade 4), Prognostic Grade Group 2 involving 45 % of 1 core.     F. Prostate, left posterior medial, biopsy:  -Prostatic adenocarcinoma, involving (2) of (2) cores:              - Douglas score 3 + 4 = 7 (40 % grade 4), Prognostic Grade Group 2 involving 70 % of 1 core.              - Douglas score 3 + 4 = 7 (40 % grade 4), Prognostic Grade Group 2 involving 25 % of 1 core.       G. Prostate, Left posterior lateral, biopsy:  -Prostatic adenocarcinoma, involving (2) of (2) cores:              - Douglas score 3 + 4 = 7 (30 % grade 4), Prognostic Grade Group 2 involving 95 % of 1 core.              - Douglas score 3 + 4 = 7 (20 %  grade 4), Prognostic Grade Group 2 involving 95 % of 1 core.       H. Prostate, left anterior lateral, biopsy:  -Prostatic adenocarcinoma, involving (1) of (2) cores:              - Burlington score 3 + 3 = 6 (0 % grade 4), Prognostic Grade Group 1 involving 20 % of 1 core.              - Burlington score 3 + 3 = 6 (0 % grade 4), Prognostic Grade Group 1 involving 5 % of 1 core.       I. Prostate, Left Anterior Medial, biopsy:  - Benign prostatic tissue.        J. Prostate, Right posterior lateral, biopsy:  -Prostatic adenocarcinoma, involving (1) of (2) cores:              - Sebastián score 3 + 3 = 6 (0 % grade 4), Prognostic Grade Group 1 involving 5 % of 1 core.  - Benign prostatic tissue is second core.       K. Prostate, REGION OF INTEREST, biopsy:  -Prostatic adenocarcinoma, involving (2) of (4) cores:              - Sebastián score 3 + 3 = 6 (0 % grade 4), Prognostic Grade Group 1 involving 5 % of 1 core.                - Sebastián score 3 + 3 = 6 (0 % grade 4), Prognostic Grade Group 1 involving 5 % of 1 core.    - High-grade prostatic intraepithelial neoplasia involving one of the 4 cores.       L. Prostate, RIGHT TRANSITIONAL ZONE, biopsy:  -Prostatic adenocarcinoma, involving (2) of (2) cores:              - Sebastián score 3 + 3 = 6 (0 % grade 4), Prognostic Grade Group 1 involving 45 % of 1 core.              - Burlington score 3 + 3 = 6 (0 % grade 4), Prognostic Grade Group 1 involving 25 % of 1 core.       M. Prostate, LEFT TRANSITIOMAL ZONE, biopsy:  -Prostatic adenocarcinoma, involving (1) of (2) cores:              - Burlington score 3 + 3 = 6 (0 % grade 4), Prognostic Grade Group 1 involving 20 % of 1 core.  - Benign prostatic tissue is second core.       Past Surgical History:   Procedure Laterality Date    ANKLE SURGERY Right     COLONOSCOPY      CORONARY ARTERY BYPASS GRAFT  2 years ago    5/2019    EGD      HERNIA REPAIR      VA PROSTATE NEEDLE BIOPSY ANY APPROACH N/A 7/17/2024    Procedure: TRANSPERINEAL MRI  FUSION  BIOPSY PROSTATE;  Surgeon: Foreign Gill MD;  Location: [facility];  Service: Urology    WISDOM TOOTH EXTRACTION          Past Medical History:   Diagnosis Date    Anesthesia complication     vaso-vagal  episodes x 3 in pacu monitored in cardiac ICU overnight    Colon polyp     GERD (gastroesophageal reflux disease)     Hx of CABG     Hyperlipidemia     Prostate cancer (HCC)         AUA SYMPTOM SCORE      Flowsheet Row Most Recent Value   AUA SYMPTOM SCORE    How often have you had a sensation of not emptying your bladder completely after you finished urinating? 1 (P)     How often have you had to urinate again less than two hours after you finished urinating? 1 (P)     How often have you found you stopped and started again several times when you urinate? 0 (P)     How often have you found it difficult to postpone urination? 1 (P)     How often have you had a weak urinary stream? 1 (P)     How often have you had to push or strain to begin urination? 0 (P)     How many times did you most typically get up to urinate from the time you went to bed at night until the time you got up in the morning? 0 (P)     Quality of Life: If you were to spend the rest of your life with your urinary condition just the way it is now, how would you feel about that? 2 (P)     AUA SYMPTOM SCORE 4 (P)               Review of Systems   Constitutional:  Negative for chills and fever.   HENT:  Negative for ear pain and sore throat.    Eyes:  Negative for pain and visual disturbance.   Respiratory:  Negative for cough and shortness of breath.    Cardiovascular:  Negative for chest pain and palpitations.   Gastrointestinal:  Negative for abdominal pain and vomiting.   Genitourinary:  Negative for dysuria and hematuria.   Musculoskeletal:  Negative for arthralgias and back pain.   Skin:  Negative for color change and rash.   Neurological:  Negative for seizures and syncope.   All other systems reviewed and are negative.         Objective:      /82 (BP Location: Left arm, Patient Position: Sitting, Cuff Size: Adult)   Pulse (!) 53   Wt 97.1 kg (214 lb)   SpO2 99%   BMI 29.85 kg/m²     Lab Results   Component Value Date    PSA 8.890 (H) 08/02/2024    PSA 8.44 (H) 02/27/2024    PSA 5.9 (H) 02/10/2023          Physical Exam  Vitals reviewed.   Constitutional:       Appearance: Normal appearance. He is normal weight.   HENT:      Head: Normocephalic and atraumatic.   Eyes:      Pupils: Pupils are equal, round, and reactive to light.   Abdominal:      General: Abdomen is flat.   Neurological:      General: No focal deficit present.      Mental Status: He is alert and oriented to person, place, and time.   Psychiatric:         Mood and Affect: Mood normal.         Thought Content: Thought content normal.           Orders  Orders Placed This Encounter   Procedures    PSA Total, Diagnostic     Standing Status:   Future     Standing Expiration Date:   8/29/2025

## 2024-08-29 NOTE — ASSESSMENT & PLAN NOTE
The patient has high-volume intermediate risk unfavorable prostate cancer with a high decipher score.  Think he warrants treatment with surgery or radiation.  We had a long discussion regarding the risks and benefits of each again.  In the past they were not very concerned about the risk of erectile dysfunction.  He is concerned about the risk of leakage which is not insignificant in him given his avid bike riding.  We discussed the role for Retzius sparing prostatectomy which we do not offer here but can be done elsewhere.  I provided him several names of urologist to do a large volume of prostate cancer work at nearby academic centers.    He is thinking about radiation versus surgery further.  He will call us with his decision.  I have also written for a repeat PSA to be drawn in October to keep an eye on PSA dynamics.    We also discussed that if his cardiology workup necessitates a delay in his care for prostate cancer there could be a role for a Lupron shot.    We will await his call.

## 2024-08-30 ENCOUNTER — HOSPITAL ENCOUNTER (OUTPATIENT)
Dept: NON INVASIVE DIAGNOSTICS | Facility: HOSPITAL | Age: 74
Discharge: HOME | End: 2024-08-30
Attending: STUDENT IN AN ORGANIZED HEALTH CARE EDUCATION/TRAINING PROGRAM
Payer: MEDICARE

## 2024-08-30 VITALS
HEART RATE: 60 BPM | DIASTOLIC BLOOD PRESSURE: 82 MMHG | BODY MASS INDEX: 29.96 KG/M2 | WEIGHT: 214 LBS | SYSTOLIC BLOOD PRESSURE: 118 MMHG | HEIGHT: 71 IN

## 2024-08-30 DIAGNOSIS — R06.02 SOB (SHORTNESS OF BREATH): ICD-10-CM

## 2024-08-30 LAB
AORTIC ROOT: 3.9 CM
AORTIC VALVE MEAN VELOCITY: 10.6 M/S
APICAL FOUR CHAMBER EJECTION FRACTION: 56 %
ASCENDING AORTA: 3.3 CM
AV LVOT MEAN GRADIENT: 3 MMHG
AV LVOT PEAK GRADIENT: 6 MMHG
AV MEAN GRADIENT: 5 MMHG
AV PEAK GRADIENT: 11 MMHG
AV VELOCITY RATIO: 0.71
BSA FOR ECHO PROCEDURE: 2.17 M2
DOP CALC AO PEAK VEL: 1.67 M/S
DOP CALC AO VTI: 32.96 CM
DOP CALC LVOT PEAK VEL VTI: 28.37 CM
DOP CALC LVOT PEAK VEL: 1.19 M/S
E WAVE DECELERATION TIME: 226 MS
E/A RATIO: 1.01
FRACTIONAL SHORTENING: 38 (ref 28–44)
INTERVENTRICULAR SEPTUM IN DIASTOLE (PARASTERNAL SHORT AXIS VIEW): 2 CM
INTERVENTRICULAR SEPTUM: 2 CM (ref 0.6–1.1)
LAAS-AP2: 15.8 CM2
LAAS-AP4: 18 CM2
LEFT ATRIUM SIZE: 4.5 CM
LEFT ATRIUM VOLUME (MOD BIPLANE): 46 ML
LEFT ATRIUM VOLUME INDEX (MOD BIPLANE): 21.2 ML/M2
LEFT INTERNAL DIMENSION IN SYSTOLE: 2.6 CM (ref 2.1–4)
LEFT VENTRICULAR INTERNAL DIMENSION IN DIASTOLE: 4.2 CM (ref 3.5–6)
LEFT VENTRICULAR POSTERIOR WALL IN END DIASTOLE: 1.4 CM
LEFT VENTRICULAR STROKE VOLUME: 53 ML
LVSV (TEICH): 53 ML
MV E'TISSUE VEL-LAT: 11 CM/S
MV E'TISSUE VEL-SEP: 10 CM/S
MV PEAK A VEL: 0.7 M/S
MV PEAK E VEL: 71 CM/S
MV STENOSIS PRESSURE HALF TIME: 65 MS
MV VALVE AREA P 1/2 METHOD: 3.38
RIGHT ATRIUM AREA SYSTOLE A4C: 13.2 CM2
RIGHT VENTRICLE ID DIMENSION: 3.9 CM
SL CV LEFT ATRIUM LENGTH A2C: 5 CM
SL CV LV EF: 55
SL CV PED ECHO LEFT VENTRICLE DIASTOLIC VOLUME (MOD BIPLANE) 2D: 78 ML
SL CV PED ECHO LEFT VENTRICLE SYSTOLIC VOLUME (MOD BIPLANE) 2D: 25 ML
TR MAX PG: 25 MMHG
TR PEAK VELOCITY: 2.5 M/S
TRICUSPID ANNULAR PLANE SYSTOLIC EXCURSION: 1.8 CM
TRICUSPID VALVE PEAK REGURGITATION VELOCITY: 2.52 M/S

## 2024-08-30 PROCEDURE — 93306 TTE W/DOPPLER COMPLETE: CPT | Performed by: STUDENT IN AN ORGANIZED HEALTH CARE EDUCATION/TRAINING PROGRAM

## 2024-08-30 PROCEDURE — 93306 TTE W/DOPPLER COMPLETE: CPT

## 2024-09-09 ENCOUNTER — HOSPITAL ENCOUNTER (OUTPATIENT)
Dept: NON INVASIVE DIAGNOSTICS | Facility: HOSPITAL | Age: 74
Discharge: HOME | End: 2024-09-09
Attending: STUDENT IN AN ORGANIZED HEALTH CARE EDUCATION/TRAINING PROGRAM
Payer: MEDICARE

## 2024-09-09 VITALS
HEIGHT: 71 IN | WEIGHT: 214 LBS | SYSTOLIC BLOOD PRESSURE: 132 MMHG | DIASTOLIC BLOOD PRESSURE: 80 MMHG | HEART RATE: 56 BPM | BODY MASS INDEX: 29.96 KG/M2

## 2024-09-09 DIAGNOSIS — I25.10 CORONARY ARTERY DISEASE INVOLVING NATIVE CORONARY ARTERY OF NATIVE HEART WITHOUT ANGINA PECTORIS: ICD-10-CM

## 2024-09-09 LAB
CHEST PAIN STATEMENT: NORMAL
MAX DIASTOLIC BP: 60 MMHG
MAX HR PERCENT: 90 %
MAX HR: 134 BPM
MAX PREDICTED HEART RATE: 148 BPM
PROTOCOL NAME: NORMAL
RATE PRESSURE PRODUCT: NORMAL
REASON FOR TERMINATION: NORMAL
SL CV LV EF: 60
SL CV STRESS RECOVERY BP: NORMAL MMHG
SL CV STRESS RECOVERY HR: 76 BPM
SL CV STRESS RECOVERY O2 SAT: 98 %
SL CV STRESS STAGE REACHED: 3
STRESS ANGINA INDEX: 1
STRESS BASELINE BP: NORMAL MMHG
STRESS BASELINE HR: 56 BPM
STRESS DUKE TREADMILL SCORE: -2
STRESS O2 SAT REST: 98 %
STRESS PEAK HR: 134 BPM
STRESS POST ESTIMATED WORKLOAD: 9.3 METS
STRESS POST EXERCISE DUR MIN: 7 MIN
STRESS POST EXERCISE DUR MIN: 7 MIN
STRESS POST EXERCISE DUR SEC: 30 SEC
STRESS POST EXERCISE DUR SEC: 30 SEC
STRESS POST O2 SAT PEAK: 94 %
STRESS POST PEAK BP: 200 MMHG
STRESS POST PEAK HR: 134 BPM
STRESS POST PEAK SYSTOLIC BP: 200 MMHG
STRESS ST ELEVATION: 1 MM
TARGET HR FORMULA: NORMAL
TEST INDICATION: NORMAL

## 2024-09-09 PROCEDURE — 93350 STRESS TTE ONLY: CPT | Performed by: INTERNAL MEDICINE

## 2024-09-09 PROCEDURE — 93350 STRESS TTE ONLY: CPT

## 2024-09-09 NOTE — H&P (VIEW-ONLY)
"Advanced Heart Failure/Pulmonary Hypertension Outpatient Note - Juan Franko 73 y.o. male MRN: [id number]    @ Encounter: [id number]      Assessment:  73 y.o. male PMH and acute problems listed later in this note (a partial list may also be included within 'assessment' section) presents for consultation.  I first met Juan Franko \"Red\" on 09/11/24.    Prior care in [place], new to [facility] cardiology as of 8/2024  Patient Active Problem List   Diagnosis    Coronary artery disease involving native coronary artery of native heart without angina pectoris    GERD (gastroesophageal reflux disease)    Mild hyperlipidemia    Presence of aortocoronary bypass graft    Chondromalacia of knee    SOB (shortness of breath)    Other insomnia    Prostate cancer (HCC)   CAD, S/p CABG 2019, had MI at that time.  2021 NST, I cannot see results 8/27/24, was done at OSH, pt reports found nothing of concern.  Never had 'weak heart muscle' per pt, no echos available 8/27/24.  He reports VZV events during hernia surgery [id number]  Cyclist, heavy exertion, just rode [place] to [place] early 2024, cycles 20 miles or more per ride in recent weeks.  No toxic habits  Brother had MI young age, another brother had cabg      Today I have reviewed all pertinent labs/imaging/data including but not limited to:        Lab Units 07/03/24  1229 02/27/24  1125   CREATININE mg/dL 0.88 0.94         Lab Results   Component Value Date    K 4.2 07/03/2024     No results found for: \"HGBA1C\"  Lab Results   Component Value Date    [id number] 1.663 02/27/2024     Lab Results   Component Value Date    LDLCALC 48 02/27/2024     No results found for: \"BNP\"   No results found for: \"NTBNP\"       TODAY'S PLAN:     09/11/24  Warm, euvolemic  No new cardiac complaints, ongoing intermittent L sided chest pain, often better with his heavy exertion, sometimes not  No new SOB    Inconclusive stress echo  Note He says test was stopped before he was symptom limited  Long " discussion about different pathways now, including complexities if he were to ever get PCI in setting of possible upcoming but as of yet unclear prostate surgery    Will pursue exercise NST now    Cw asa and high intens statin    Taken off bblocker years ago 2/2 hypotension/dizziness    No Rx changers today    He is pill averse and wishes to hold off new anti-anginals for now    May have upcoming prostatectomy  Getting second opinion from another uro and onc team in [place] soon    Follow up:  With me in 2 weeks, after testing. Sooner if Sx evolve.  In addition to follow up with their other medical providers    Key info from my prior notes:    I am meeting patient for the first time today  Warm, euvolemic  + increasing CP, substernal and L sided, sometimes better with exertion/cycling sometimes not  +life stress recently  Off ASA recently 2/2 prostate procedures, newly restarted last few days  No overt new SOB, has been doing less exercise recently and resting more    Minimal data available in epic prior to first meeting, cardiac test results do not actually populate in epic/careeverwhere    Check echo rest and exercise stress echo    Prostate ca surgery planning underway    Studies:  Today I have reviewed all pertinent patient data/labs/imaging where available, including but not limited to the below studies. This includes my independent interpretation. Selected results may be displayed here but comprehensive listing is omitted for note clarity and can be found in the epic chart.    ECG.    Echo.    Stress.    Cath.    HPI:   73 y.o. male PMH and acute problems listed later in this note (a partial list may also be included within 'assessment' section) presents for consultation.  No new SOB/dizziness/palpitations/syncope.  No new unintentional weight changes.  No new leg swelling, PND, pillow orthopnea.  No new fevers, chills, cough, nausea, vomiting, diarrhea, dysuria.    Interval History:  As noted in 'plan'  section above and prior epic chart notes.    No new SOB/dizziness/palpitations/syncope.  No new fatigue.  No new unintentional weight changes.  No new leg swelling, PND, pillow orthopnea.  No new fevers, chills, cough, nausea, vomiting, diarrhea, dysuria.    ROS:  10 point ROS negative except as specified in HPI    Past Medical History:   Diagnosis Date    Anesthesia complication     vaso-vagal  episodes x 3 in pacu monitored in cardiac ICU overnight    Colon polyp     GERD (gastroesophageal reflux disease)     Hx of CABG     Hyperlipidemia     Prostate cancer (HCC)      Patient Active Problem List   Diagnosis    Coronary artery disease involving native coronary artery of native heart without angina pectoris    GERD (gastroesophageal reflux disease)    Mild hyperlipidemia    Presence of aortocoronary bypass graft    Chondromalacia of knee    SOB (shortness of breath)    Other insomnia    Prostate cancer (HCC)     No current facility-administered medications for this visit.      Allergies   Allergen Reactions    Propofol Other (See Comments)     Vaso-vagal reaction  x 3 pt monitored in cardiac ICU overnight     Social History     Socioeconomic History    Marital status: /Civil Union     Spouse name: Not on file    Number of children: Not on file    Years of education: Not on file    Highest education level: Not on file   Occupational History    Not on file   Tobacco Use    Smoking status: Never    Smokeless tobacco: Never   Vaping Use    Vaping status: Never Used   Substance and Sexual Activity    Alcohol use: Never    Drug use: Never    Sexual activity: Yes   Other Topics Concern    Not on file   Social History Narrative    Not on file     Social Determinants of Health     Financial Resource Strain: Low Risk  (2/11/2024)    Overall Financial Resource Strain (CARDIA)     Difficulty of Paying Living Expenses: Not very hard   Food Insecurity: Not on file   Transportation Needs: No Transportation Needs  "(2/11/2024)    PRAPARE - Transportation     Lack of Transportation (Medical): No     Lack of Transportation (Non-Medical): No   Physical Activity: Sufficiently Active (11/23/2020)    [facility]    Exercise Vital Sign     Days of Exercise per Week: 7 days     Minutes of Exercise per Session: 90 min   Stress: Not on file   Social Connections: Unknown (3/14/2021)    [facility], [facility]    Social Connections     Conversations with friends/family/neighbors per week: Not on file   Intimate Partner Violence: Not on file   Housing Stability: Low Risk  (7/10/2021)    [facility], [facility]    Housing Stability     Mortgage Payment Concerns?: Not on file     Number of Places Lived in the Last Year: Not on file     Unstable Housing?: Not on file     Family History   Problem Relation Age of Onset    Stroke Brother     Esophageal cancer Brother        Physical Exam:  Vitals:    09/11/24 1127   BP: 130/80   BP Location: Left arm   Patient Position: Sitting   Cuff Size: Standard   Pulse: (!) 53   SpO2: 98%   Weight: 96.2 kg (212 lb)   Height: 5' 11\" (1.803 m)       Constitutional: NAD, non toxic  Ears/nose/mouth/throat: atraumatic  CV: RRR, nl S1S2, no murmurs/rubs/gallups, no JVD, no HJR  Resp: CTABL  GI: Soft, NTND  MSK: no swollen joints in exposed areas  Extr: No edema, warm LE  Pysche: Normal affect  Neuro: appropriate in conversation  Skin: dry and intact in exposed areas    Labs & Results:  Lab Results   Component Value Date    WBC 6.56 07/03/2024    HGB 15.2 07/03/2024    HCT 44.7 07/03/2024    MCV 96 07/03/2024     07/03/2024     Lab Results   Component Value Date    SODIUM 140 07/03/2024    K 4.2 07/03/2024     07/03/2024    CO2 28 07/03/2024    BUN 13 07/03/2024    CREATININE 0.88 07/03/2024    GLUC 87 07/03/2024    CALCIUM 9.1 07/03/2024 "       Counseling / Coordination of Care  Greater than 50% of total time was spent with the patient and / or family counseling and / or coordination of care. Discussion included diagnoses, most recent studies and any changes in treatment.    Thank you for the opportunity to participate in the care of this patient.    Jairon Mojica MD  Attending Physician  Advanced Heart Failure and Transplant Cardiology  [facility]

## 2024-09-09 NOTE — PROGRESS NOTES
"Advanced Heart Failure/Pulmonary Hypertension Outpatient Note - Juan Franko 73 y.o. male MRN: [id number]    @ Encounter: [id number]      Assessment:  73 y.o. male PMH and acute problems listed later in this note (a partial list may also be included within 'assessment' section) presents for consultation.  I first met Juan Franko \"Red\" on 09/11/24.    Prior care in [place], new to [facility] cardiology as of 8/2024  Patient Active Problem List   Diagnosis    Coronary artery disease involving native coronary artery of native heart without angina pectoris    GERD (gastroesophageal reflux disease)    Mild hyperlipidemia    Presence of aortocoronary bypass graft    Chondromalacia of knee    SOB (shortness of breath)    Other insomnia    Prostate cancer (HCC)   CAD, S/p CABG 2019, had MI at that time.  2021 NST, I cannot see results 8/27/24, was done at OSH, pt reports found nothing of concern.  Never had 'weak heart muscle' per pt, no echos available 8/27/24.  He reports VZV events during hernia surgery [id number]  Cyclist, heavy exertion, just rode [place] to [place] early 2024, cycles 20 miles or more per ride in recent weeks.  No toxic habits  Brother had MI young age, another brother had cabg      Today I have reviewed all pertinent labs/imaging/data including but not limited to:        Lab Units 07/03/24  1229 02/27/24  1125   CREATININE mg/dL 0.88 0.94         Lab Results   Component Value Date    K 4.2 07/03/2024     No results found for: \"HGBA1C\"  Lab Results   Component Value Date    KPK6ZDJZXWSR 1.663 02/27/2024     Lab Results   Component Value Date    LDLCALC 48 02/27/2024     No results found for: \"BNP\"   No results found for: \"NTBNP\"       TODAY'S PLAN:     09/11/24  Warm, euvolemic  No new cardiac complaints, ongoing intermittent L sided chest pain, often better with his heavy exertion, sometimes not  No new SOB    Inconclusive stress echo  Note He says test was stopped before he was symptom limited  Long " discussion about different pathways now, including complexities if he were to ever get PCI in setting of possible upcoming but as of yet unclear prostate surgery    Will pursue exercise NST now    Cw asa and high intens statin    Taken off bblocker years ago 2/2 hypotension/dizziness    No Rx changers today    He is pill averse and wishes to hold off new anti-anginals for now    May have upcoming prostatectomy  Getting second opinion from another uro and onc team in [place] soon    Follow up:  With me in 2 weeks, after testing. Sooner if Sx evolve.  In addition to follow up with their other medical providers    Key info from my prior notes:    I am meeting patient for the first time today  Warm, euvolemic  + increasing CP, substernal and L sided, sometimes better with exertion/cycling sometimes not  +life stress recently  Off ASA recently 2/2 prostate procedures, newly restarted last few days  No overt new SOB, has been doing less exercise recently and resting more    Minimal data available in epic prior to first meeting, cardiac test results do not actually populate in epic/careeverwhere    Check echo rest and exercise stress echo    Prostate ca surgery planning underway    Studies:  Today I have reviewed all pertinent patient data/labs/imaging where available, including but not limited to the below studies. This includes my independent interpretation. Selected results may be displayed here but comprehensive listing is omitted for note clarity and can be found in the epic chart.    ECG.    Echo.    Stress.    Cath.    HPI:   73 y.o. male PMH and acute problems listed later in this note (a partial list may also be included within 'assessment' section) presents for consultation.  No new SOB/dizziness/palpitations/syncope.  No new unintentional weight changes.  No new leg swelling, PND, pillow orthopnea.  No new fevers, chills, cough, nausea, vomiting, diarrhea, dysuria.    Interval History:  As noted in 'plan'  section above and prior epic chart notes.    No new SOB/dizziness/palpitations/syncope.  No new fatigue.  No new unintentional weight changes.  No new leg swelling, PND, pillow orthopnea.  No new fevers, chills, cough, nausea, vomiting, diarrhea, dysuria.    ROS:  10 point ROS negative except as specified in HPI    Past Medical History:   Diagnosis Date    Anesthesia complication     vaso-vagal  episodes x 3 in pacu monitored in cardiac ICU overnight    Colon polyp     GERD (gastroesophageal reflux disease)     Hx of CABG     Hyperlipidemia     Prostate cancer (HCC)      Patient Active Problem List   Diagnosis    Coronary artery disease involving native coronary artery of native heart without angina pectoris    GERD (gastroesophageal reflux disease)    Mild hyperlipidemia    Presence of aortocoronary bypass graft    Chondromalacia of knee    SOB (shortness of breath)    Other insomnia    Prostate cancer (HCC)     No current facility-administered medications for this visit.      Allergies   Allergen Reactions    Propofol Other (See Comments)     Vaso-vagal reaction  x 3 pt monitored in cardiac ICU overnight     Social History     Socioeconomic History    Marital status: /Civil Union     Spouse name: Not on file    Number of children: Not on file    Years of education: Not on file    Highest education level: Not on file   Occupational History    Not on file   Tobacco Use    Smoking status: Never    Smokeless tobacco: Never   Vaping Use    Vaping status: Never Used   Substance and Sexual Activity    Alcohol use: Never    Drug use: Never    Sexual activity: Yes   Other Topics Concern    Not on file   Social History Narrative    Not on file     Social Determinants of Health     Financial Resource Strain: Low Risk  (2/11/2024)    Overall Financial Resource Strain (CARDIA)     Difficulty of Paying Living Expenses: Not very hard   Food Insecurity: Not on file   Transportation Needs: No Transportation Needs  "(2/11/2024)    PRAPARE - Transportation     Lack of Transportation (Medical): No     Lack of Transportation (Non-Medical): No   Physical Activity: Sufficiently Active (11/23/2020)    [facility] from [employer] [employer], [employer]    Exercise Vital Sign     Days of Exercise per Week: 7 days     Minutes of Exercise per Session: 90 min   Stress: Not on file   Social Connections: Unknown (3/14/2021)    [facility]    Social Connections     Conversations with friends/family/neighbors per week: Not on file   Intimate Partner Violence: Not on file   Housing Stability: Low Risk  (7/10/2021)    [facility]    Housing Stability     Mortgage Payment Concerns?: Not on file     Number of Places Lived in the Last Year: Not on file     Unstable Housing?: Not on file     Family History   Problem Relation Age of Onset    Stroke Brother     Esophageal cancer Brother        Physical Exam:  Vitals:    09/11/24 1127   BP: 130/80   BP Location: Left arm   Patient Position: Sitting   Cuff Size: Standard   Pulse: (!) 53   SpO2: 98%   Weight: 96.2 kg (212 lb)   Height: 5' 11\" (1.803 m)       Constitutional: NAD, non toxic  Ears/nose/mouth/throat: atraumatic  CV: RRR, nl S1S2, no murmurs/rubs/gallups, no JVD, no HJR  Resp: CTABL  GI: Soft, NTND  MSK: no swollen joints in exposed areas  Extr: No edema, warm LE  Pysche: Normal affect  Neuro: appropriate in conversation  Skin: dry and intact in exposed areas    Labs & Results:  Lab Results   Component Value Date    WBC 6.56 07/03/2024    HGB 15.2 07/03/2024    HCT 44.7 07/03/2024    MCV 96 07/03/2024     07/03/2024     Lab Results   Component Value Date    SODIUM 140 07/03/2024    K 4.2 07/03/2024     07/03/2024    CO2 28 07/03/2024    BUN 13 07/03/2024    CREATININE 0.88 07/03/2024    GLUC 87 07/03/2024    CALCIUM 9.1 07/03/2024 "       Counseling / Coordination of Care  Greater than 50% of total time was spent with the patient and / or family counseling and / or coordination of care. Discussion included diagnoses, most recent studies and any changes in treatment.    Thank you for the opportunity to participate in the care of this patient.    Jairon Mojica MD  Attending Physician  Advanced Heart Failure and Transplant Cardiology  [facility]

## 2024-09-11 ENCOUNTER — OFFICE VISIT (OUTPATIENT)
Dept: CARDIOLOGY CLINIC | Facility: CLINIC | Age: 74
End: 2024-09-11
Payer: MEDICARE

## 2024-09-11 VITALS
OXYGEN SATURATION: 98 % | WEIGHT: 212 LBS | SYSTOLIC BLOOD PRESSURE: 130 MMHG | HEART RATE: 53 BPM | HEIGHT: 71 IN | DIASTOLIC BLOOD PRESSURE: 80 MMHG | BODY MASS INDEX: 29.68 KG/M2

## 2024-09-11 DIAGNOSIS — C61 PROSTATE CANCER (HCC): ICD-10-CM

## 2024-09-11 DIAGNOSIS — I25.10 CORONARY ARTERY DISEASE INVOLVING NATIVE CORONARY ARTERY OF NATIVE HEART WITHOUT ANGINA PECTORIS: Primary | ICD-10-CM

## 2024-09-11 DIAGNOSIS — R07.9 CHEST PAIN, UNSPECIFIED TYPE: ICD-10-CM

## 2024-09-11 DIAGNOSIS — Z01.810 PRE-OPERATIVE CARDIOVASCULAR EXAM, NEW EKG ABNORMALITIES C/W ISCHEMIA: ICD-10-CM

## 2024-09-11 DIAGNOSIS — R94.31 PRE-OPERATIVE CARDIOVASCULAR EXAM, NEW EKG ABNORMALITIES C/W ISCHEMIA: ICD-10-CM

## 2024-09-11 DIAGNOSIS — I25.10 CORONARY ARTERY DISEASE INVOLVING NATIVE HEART, UNSPECIFIED VESSEL OR LESION TYPE, UNSPECIFIED WHETHER ANGINA PRESENT: ICD-10-CM

## 2024-09-11 PROCEDURE — 99214 OFFICE O/P EST MOD 30 MIN: CPT | Performed by: STUDENT IN AN ORGANIZED HEALTH CARE EDUCATION/TRAINING PROGRAM

## 2024-09-11 PROCEDURE — G2211 COMPLEX E/M VISIT ADD ON: HCPCS | Performed by: STUDENT IN AN ORGANIZED HEALTH CARE EDUCATION/TRAINING PROGRAM

## 2024-09-13 ENCOUNTER — PATIENT MESSAGE (OUTPATIENT)
Dept: CARDIOLOGY CLINIC | Facility: CLINIC | Age: 74
End: 2024-09-13

## 2024-09-13 ENCOUNTER — HOSPITAL ENCOUNTER (OUTPATIENT)
Dept: NON INVASIVE DIAGNOSTICS | Facility: CLINIC | Age: 74
Discharge: HOME | End: 2024-09-13
Payer: MEDICARE

## 2024-09-13 ENCOUNTER — TELEPHONE (OUTPATIENT)
Dept: CARDIOLOGY CLINIC | Facility: CLINIC | Age: 74
End: 2024-09-13

## 2024-09-13 VITALS — HEIGHT: 71 IN | BODY MASS INDEX: 29.68 KG/M2 | WEIGHT: 212 LBS

## 2024-09-13 DIAGNOSIS — Z01.810 PRE-OPERATIVE CARDIOVASCULAR EXAM, NEW EKG ABNORMALITIES C/W ISCHEMIA: ICD-10-CM

## 2024-09-13 DIAGNOSIS — I25.10 CORONARY ARTERY DISEASE INVOLVING NATIVE HEART, UNSPECIFIED VESSEL OR LESION TYPE, UNSPECIFIED WHETHER ANGINA PRESENT: Primary | ICD-10-CM

## 2024-09-13 DIAGNOSIS — R94.31 PRE-OPERATIVE CARDIOVASCULAR EXAM, NEW EKG ABNORMALITIES C/W ISCHEMIA: ICD-10-CM

## 2024-09-13 DIAGNOSIS — I25.10 CORONARY ARTERY DISEASE INVOLVING NATIVE CORONARY ARTERY OF NATIVE HEART WITHOUT ANGINA PECTORIS: ICD-10-CM

## 2024-09-13 LAB
MAX HR PERCENT: 93 %
MAX HR: 139 BPM
NUC STRESS EJECTION FRACTION: 73 %
RATE PRESSURE PRODUCT: NORMAL
SL CV REST NUCLEAR ISOTOPE DOSE: 10.31 MCI
SL CV STRESS NUCLEAR ISOTOPE DOSE: 32.8 MCI
SL CV STRESS RECOVERY BP: NORMAL MMHG
SL CV STRESS RECOVERY HR: 71 BPM
SL CV STRESS RECOVERY O2 SAT: 98 %
SL CV STRESS STAGE REACHED: 3
STRESS ANGINA INDEX: 1
STRESS BASELINE BP: NORMAL MMHG
STRESS BASELINE HR: 56 BPM
STRESS DUKE TREADMILL SCORE: -10
STRESS O2 SAT REST: 98 %
STRESS PEAK HR: 139 BPM
STRESS POST ESTIMATED WORKLOAD: 10.1 METS
STRESS POST EXERCISE DUR MIN: 9 MIN
STRESS POST EXERCISE DUR SEC: 0 SEC
STRESS POST O2 SAT PEAK: 96 %
STRESS POST PEAK BP: 160 MMHG
STRESS ST DEPRESSION: 3 MM
STRESS/REST PERFUSION RATIO: 1.13

## 2024-09-13 PROCEDURE — 78452 HT MUSCLE IMAGE SPECT MULT: CPT | Performed by: INTERNAL MEDICINE

## 2024-09-13 PROCEDURE — 93018 CV STRESS TEST I&R ONLY: CPT | Performed by: INTERNAL MEDICINE

## 2024-09-13 PROCEDURE — A9502 TC99M TETROFOSMIN: HCPCS

## 2024-09-13 PROCEDURE — 78452 HT MUSCLE IMAGE SPECT MULT: CPT

## 2024-09-13 PROCEDURE — 93016 CV STRESS TEST SUPVJ ONLY: CPT | Performed by: INTERNAL MEDICINE

## 2024-09-13 PROCEDURE — 93017 CV STRESS TEST TRACING ONLY: CPT

## 2024-09-13 NOTE — RESULT ENCOUNTER NOTE
Discussed results with noninvasive and IC teams. Discussed at length with pt 9/13/24 2:45pm. Plan for LHC, diagnostic only.

## 2024-09-13 NOTE — TELEPHONE ENCOUNTER
"Patient scheduled for Left Heart Cath on 9/17/24 at [facility] with Dr. Yadav.      Instructions sent to patient through [employer].      Patient aware of all general instructions.    Medication holds:   N/A    Blood Thinners:   N/A    Labs to be done on 9/14/24:  CMP / CBC (fasting 8 hours)       Thank you,  Anabel \"Doris\" Godfrey      "

## 2024-09-13 NOTE — TELEPHONE ENCOUNTER
----- Message from Jairon Mojica MD sent at 9/13/2024  3:04 PM EDT -----  Regarding: Please schedule this pt for LHC  With Andreea Yadav, aware of case.    Diagnostic LHC only.    Ideally to be done next week (latest within 2 weeks).    Ideally at [facility] but other locations ok if that's all that would be available.      Thanks!    - Jairon

## 2024-09-14 ENCOUNTER — APPOINTMENT (OUTPATIENT)
Dept: LAB | Facility: CLINIC | Age: 74
End: 2024-09-14
Payer: MEDICARE

## 2024-09-14 DIAGNOSIS — C61 PROSTATE CANCER (HCC): ICD-10-CM

## 2024-09-14 LAB
ALBUMIN SERPL BCG-MCNC: 4.2 G/DL (ref 3.5–5)
ALP SERPL-CCNC: 101 U/L (ref 34–104)
ALT SERPL W P-5'-P-CCNC: 21 U/L (ref 7–52)
ANION GAP SERPL CALCULATED.3IONS-SCNC: 9 MMOL/L (ref 4–13)
AST SERPL W P-5'-P-CCNC: 23 U/L (ref 13–39)
BASOPHILS # BLD AUTO: 0.03 THOUSANDS/ÂΜL (ref 0–0.1)
BASOPHILS NFR BLD AUTO: 1 % (ref 0–1)
BILIRUB SERPL-MCNC: 0.86 MG/DL (ref 0.2–1)
BUN SERPL-MCNC: 15 MG/DL (ref 5–25)
CALCIUM SERPL-MCNC: 9.1 MG/DL (ref 8.4–10.2)
CHLORIDE SERPL-SCNC: 103 MMOL/L (ref 96–108)
CO2 SERPL-SCNC: 28 MMOL/L (ref 21–32)
CREAT SERPL-MCNC: 0.99 MG/DL (ref 0.6–1.3)
EOSINOPHIL # BLD AUTO: 0.22 THOUSAND/ÂΜL (ref 0–0.61)
EOSINOPHIL NFR BLD AUTO: 3 % (ref 0–6)
ERYTHROCYTE [DISTWIDTH] IN BLOOD BY AUTOMATED COUNT: 12.3 % (ref 11.6–15.1)
GFR SERPL CREATININE-BSD FRML MDRD: 75 ML/MIN/1.73SQ M
GLUCOSE P FAST SERPL-MCNC: 88 MG/DL (ref 65–99)
HCT VFR BLD AUTO: 48.1 % (ref 36.5–49.3)
HGB BLD-MCNC: 16.4 G/DL (ref 12–17)
IMM GRANULOCYTES # BLD AUTO: 0.02 THOUSAND/UL (ref 0–0.2)
IMM GRANULOCYTES NFR BLD AUTO: 0 % (ref 0–2)
LYMPHOCYTES # BLD AUTO: 1.84 THOUSANDS/ÂΜL (ref 0.6–4.47)
LYMPHOCYTES NFR BLD AUTO: 28 % (ref 14–44)
MCH RBC QN AUTO: 32.6 PG (ref 26.8–34.3)
MCHC RBC AUTO-ENTMCNC: 34.1 G/DL (ref 31.4–37.4)
MCV RBC AUTO: 96 FL (ref 82–98)
MONOCYTES # BLD AUTO: 0.81 THOUSAND/ÂΜL (ref 0.17–1.22)
MONOCYTES NFR BLD AUTO: 12 % (ref 4–12)
NEUTROPHILS # BLD AUTO: 3.68 THOUSANDS/ÂΜL (ref 1.85–7.62)
NEUTS SEG NFR BLD AUTO: 56 % (ref 43–75)
NRBC BLD AUTO-RTO: 0 /100 WBCS
PLATELET # BLD AUTO: 235 THOUSANDS/UL (ref 149–390)
PMV BLD AUTO: 9.2 FL (ref 8.9–12.7)
POTASSIUM SERPL-SCNC: 4.5 MMOL/L (ref 3.5–5.3)
PROT SERPL-MCNC: 6.5 G/DL (ref 6.4–8.4)
PSA SERPL-MCNC: 10.38 NG/ML (ref 0–4)
RBC # BLD AUTO: 5.03 MILLION/UL (ref 3.88–5.62)
SODIUM SERPL-SCNC: 140 MMOL/L (ref 135–147)
WBC # BLD AUTO: 6.6 THOUSAND/UL (ref 4.31–10.16)

## 2024-09-14 PROCEDURE — 36415 COLL VENOUS BLD VENIPUNCTURE: CPT

## 2024-09-14 PROCEDURE — 84153 ASSAY OF PSA TOTAL: CPT

## 2024-09-14 PROCEDURE — 80053 COMPREHEN METABOLIC PANEL: CPT

## 2024-09-14 PROCEDURE — 85025 COMPLETE CBC W/AUTO DIFF WBC: CPT

## 2024-09-16 LAB
CHEST PAIN STATEMENT: NORMAL
MAX DIASTOLIC BP: 78 MMHG
MAX PREDICTED HEART RATE: 148 BPM
PROTOCOL NAME: NORMAL
REASON FOR TERMINATION: NORMAL
STRESS POST EXERCISE DUR MIN: 9 MIN
STRESS POST EXERCISE DUR SEC: 0 SEC
STRESS POST PEAK HR: 139 BPM
STRESS POST PEAK SYSTOLIC BP: 160 MMHG
TARGET HR FORMULA: NORMAL
TEST INDICATION: NORMAL

## 2024-09-17 ENCOUNTER — HOSPITAL ENCOUNTER (OUTPATIENT)
Facility: HOSPITAL | Age: 74
Setting detail: OUTPATIENT SURGERY
Discharge: HOME | End: 2024-09-17
Attending: INTERNAL MEDICINE | Admitting: INTERNAL MEDICINE
Payer: MEDICARE

## 2024-09-17 ENCOUNTER — TELEPHONE (OUTPATIENT)
Age: 74
End: 2024-09-17

## 2024-09-17 VITALS
SYSTOLIC BLOOD PRESSURE: 133 MMHG | RESPIRATION RATE: 16 BRPM | WEIGHT: 209.5 LBS | HEART RATE: 51 BPM | TEMPERATURE: 97 F | BODY MASS INDEX: 28.38 KG/M2 | DIASTOLIC BLOOD PRESSURE: 65 MMHG | OXYGEN SATURATION: 97 % | HEIGHT: 72 IN

## 2024-09-17 DIAGNOSIS — Z95.1 S/P CABG X 4: ICD-10-CM

## 2024-09-17 DIAGNOSIS — R06.02 SOB (SHORTNESS OF BREATH): ICD-10-CM

## 2024-09-17 DIAGNOSIS — I25.10 CORONARY ARTERY DISEASE INVOLVING NATIVE CORONARY ARTERY OF NATIVE HEART WITHOUT ANGINA PECTORIS: ICD-10-CM

## 2024-09-17 DIAGNOSIS — I25.10 CORONARY ARTERY DISEASE INVOLVING NATIVE HEART, UNSPECIFIED VESSEL OR LESION TYPE, UNSPECIFIED WHETHER ANGINA PRESENT: ICD-10-CM

## 2024-09-17 DIAGNOSIS — I25.10 CORONARY ARTERY DISEASE INVOLVING NATIVE HEART, UNSPECIFIED VESSEL OR LESION TYPE, UNSPECIFIED WHETHER ANGINA PRESENT: Primary | ICD-10-CM

## 2024-09-17 LAB
ATRIAL RATE: 52 BPM
P AXIS: 26 DEGREES
PR INTERVAL: 212 MS
QRS AXIS: 6 DEGREES
QRSD INTERVAL: 90 MS
QT INTERVAL: 442 MS
QTC INTERVAL: 411 MS
T WAVE AXIS: 54 DEGREES
VENTRICULAR RATE: 52 BPM

## 2024-09-17 PROCEDURE — 93459 L HRT ART/GRFT ANGIO: CPT | Performed by: INTERNAL MEDICINE

## 2024-09-17 PROCEDURE — 99152 MOD SED SAME PHYS/QHP 5/>YRS: CPT | Performed by: INTERNAL MEDICINE

## 2024-09-17 PROCEDURE — C1769 GUIDE WIRE: HCPCS | Performed by: INTERNAL MEDICINE

## 2024-09-17 PROCEDURE — 99153 MOD SED SAME PHYS/QHP EA: CPT | Performed by: INTERNAL MEDICINE

## 2024-09-17 PROCEDURE — 93005 ELECTROCARDIOGRAM TRACING: CPT

## 2024-09-17 PROCEDURE — C1894 INTRO/SHEATH, NON-LASER: HCPCS | Performed by: INTERNAL MEDICINE

## 2024-09-17 PROCEDURE — 93010 ELECTROCARDIOGRAM REPORT: CPT | Performed by: INTERNAL MEDICINE

## 2024-09-17 PROCEDURE — C1760 CLOSURE DEV, VASC: HCPCS | Performed by: INTERNAL MEDICINE

## 2024-09-17 DEVICE — PERCLOSE™ PROSTYLE™ SUTURE-MEDIATED CLOSURE AND REPAIR SYSTEM
Type: IMPLANTABLE DEVICE | Site: GROIN | Status: FUNCTIONAL
Brand: PERCLOSE™ PROSTYLE™

## 2024-09-17 RX ORDER — LIDOCAINE HYDROCHLORIDE 10 MG/ML
INJECTION, SOLUTION EPIDURAL; INFILTRATION; INTRACAUDAL; PERINEURAL CODE/TRAUMA/SEDATION MEDICATION
Status: DISCONTINUED | OUTPATIENT
Start: 2024-09-17 | End: 2024-09-17 | Stop reason: HOSPADM

## 2024-09-17 RX ORDER — SODIUM CHLORIDE 9 MG/ML
125 INJECTION, SOLUTION INTRAVENOUS CONTINUOUS
Status: DISCONTINUED | OUTPATIENT
Start: 2024-09-17 | End: 2024-09-17 | Stop reason: HOSPADM

## 2024-09-17 RX ORDER — AMLODIPINE BESYLATE 2.5 MG/1
2.5 TABLET ORAL DAILY
Qty: 30 TABLET | Refills: 3 | Status: SHIPPED | OUTPATIENT
Start: 2024-09-17 | End: 2024-09-18

## 2024-09-17 RX ORDER — ISOSORBIDE MONONITRATE 30 MG/1
30 TABLET, EXTENDED RELEASE ORAL EVERY EVENING
Qty: 30 TABLET | Refills: 3 | Status: SHIPPED | OUTPATIENT
Start: 2024-09-17 | End: 2024-09-18

## 2024-09-17 RX ORDER — MIDAZOLAM HYDROCHLORIDE 2 MG/2ML
INJECTION, SOLUTION INTRAMUSCULAR; INTRAVENOUS CODE/TRAUMA/SEDATION MEDICATION
Status: DISCONTINUED | OUTPATIENT
Start: 2024-09-17 | End: 2024-09-17 | Stop reason: HOSPADM

## 2024-09-17 RX ORDER — FENTANYL CITRATE 50 UG/ML
INJECTION, SOLUTION INTRAMUSCULAR; INTRAVENOUS CODE/TRAUMA/SEDATION MEDICATION
Status: DISCONTINUED | OUTPATIENT
Start: 2024-09-17 | End: 2024-09-17 | Stop reason: HOSPADM

## 2024-09-17 RX ORDER — ASPIRIN 81 MG/1
324 TABLET, CHEWABLE ORAL ONCE
Status: COMPLETED | OUTPATIENT
Start: 2024-09-17 | End: 2024-09-17

## 2024-09-17 RX ADMIN — ASPIRIN 81 MG CHEWABLE TABLET 243 MG: 81 TABLET CHEWABLE at 08:32

## 2024-09-17 NOTE — TELEPHONE ENCOUNTER
Patient of Dr Smith.    Pt calling in regards to prostate cancer. Pt had f/u appt with provider on 8/29 where surgery and radiation was discussed. Pt sts he had cardiology work up and they told him he should be cleared for surgery.     Pt is requesting a call back from clinical to discuss f/u visit with Dr Smith to move forward with prostate cancer treatment.    Pt call back- [phone]

## 2024-09-17 NOTE — DISCHARGE INSTR - AVS FIRST PAGE
1. Please see the post cardiac catheterization dishcarge instructions.   No heavy lifting, greater than 10 lbs. or strenuous  activity for 48 hrs.    2.Remove band aid tomorrow.  Shower and wash area- groin gently with soap and water- beginning tomorrow. Rinse and pat dry.  Apply new water seal band aid.  Repeat this process for 5 days. No powders, creams lotions or antibiotic ointments  for 5 days.  No tub baths, hot tubs or swimming for 5 days.     3. Please call our office ([phone]) if you have any fever, redness, swelling, discharge from your groin access site.    4.No driving for 1 day    5. Angioseal Booklet

## 2024-09-17 NOTE — INTERVAL H&P NOTE
Vitals:    09/17/24 0842   BP: 127/71   Pulse: (!) 54   Resp: 16   Temp: 98.2 °F (36.8 °C)   SpO2: 96%         Patient seen and examined at bedside.    Brief overview of procedure discussed with patient.  Indication, risk, benefits and alternatives discussed with patient who verbalized understanding.  All questions addressed fully.  Patient elected to proceed with planned procedure, consent completed.    Patient remains clinically stable.  Renal function, coagulation profile, and hemoglobin all within normal limits.  Patient being considered for prostate surgery at this time.  We will proceed with planned procedure.    Jon Brown

## 2024-09-18 ENCOUNTER — TELEPHONE (OUTPATIENT)
Dept: UROLOGY | Facility: AMBULATORY SURGERY CENTER | Age: 74
End: 2024-09-18

## 2024-09-18 DIAGNOSIS — C61 PROSTATE CANCER (HCC): Primary | ICD-10-CM

## 2024-09-18 RX ORDER — ISOSORBIDE MONONITRATE 30 MG/1
TABLET, EXTENDED RELEASE ORAL
Qty: 90 TABLET | Refills: 1 | Status: SHIPPED | OUTPATIENT
Start: 2024-09-18

## 2024-09-18 RX ORDER — AMLODIPINE BESYLATE 2.5 MG/1
2.5 TABLET ORAL DAILY
Qty: 90 TABLET | Refills: 1 | Status: SHIPPED | OUTPATIENT
Start: 2024-09-18

## 2024-09-18 NOTE — TELEPHONE ENCOUNTER
Patient had a long discussion regarding his prostate cancer.  He is getting worked up by cardiology and appears to be cleared for surgery if you choose to pursue.  He is still deciding about surgery versus radiation.    We went over his risk for recurrence based on the [facility] prostate cancer nomogram which puts his risk of recurrence at about 30% at 5 years and 50% at 10 years although this may be over estimating is a risk given we used grade group 2 prostate cancer and the majority of his cores were grade group 1.    We rediscussed the risks of incontinence associated with surgery and the expected timeline of how this heals.  We discussed how this compares to radiation which does not really have a risk of incontinence    Pt reports 3lbs weight loss over 4 weeks. To this end we will get a PSMA Pet scan to be safe even though MRI did not suggest any signs of extra-prostatic disease.  However I think it is reasonable to perform given he had a large volume of prostate cancer and high decipher score even though the majority of the cores were grade group 1    Given the PET scan shows a localized disease the patient is going to decide about surgery versus radiation and will call us if he wants to move forward with surgery

## 2024-09-18 NOTE — TELEPHONE ENCOUNTER
Called and spoke with pt. Advised of appt date, time and location for PET SCAN. Instructions given. Pt aware all information also available on [employer]. Pt verbalized understanding and was thankful for call.

## 2024-09-20 NOTE — TELEPHONE ENCOUNTER
Jess from [facility] is the  and will be faxing a request for pt records. Pt has an appt with [facility] on Monday 9/23/24

## 2024-09-23 ENCOUNTER — TELEPHONE (OUTPATIENT)
Dept: UROLOGY | Facility: AMBULATORY SURGERY CENTER | Age: 74
End: 2024-09-23

## 2024-09-23 NOTE — TELEPHONE ENCOUNTER
Jess from [facility] called stating patient will be arriving at 10 am and they were looking for the records.  Decipher report on patient.  This is what they need.  Please fax any office notes.  Please review and advise.         Phone number is [phone]    Fax number [phone]

## 2024-09-23 NOTE — TELEPHONE ENCOUNTER
Medical Records fax was received on 9-20-24 and sent to [facility] on 09-23-24, a different encounter has been opened.

## 2024-09-23 NOTE — TELEPHONE ENCOUNTER
-3 page incoming fax from [facility] requesting Medical Records.    -MRO sent    -Confirmation received

## 2024-09-24 NOTE — PROGRESS NOTES
"Advanced Heart Failure/Pulmonary Hypertension Outpatient Note - Juan Abdul 73 y.o. male MRN: [id number]    @ Encounter: [id number]      Assessment:  73 y.o. male PMH and acute problems listed later in this note (a partial list may also be included within 'assessment' section) presents for consultation.  I first met Juan Abdul \"Red\" on 09/25/24.    Prior care in [place], new to [facility] cardiology as of 8/2024  Patient Active Problem List   Diagnosis    Coronary artery disease involving native coronary artery of native heart without angina pectoris    GERD (gastroesophageal reflux disease)    Mild hyperlipidemia    Presence of aortocoronary bypass graft    Chondromalacia of knee    SOB (shortness of breath)    Other insomnia    Prostate cancer (HCC)   CAD, S/p CABG 2019, had MI at that time.  9/2024 [facility]:   Severe Native CAD:  Mid LAD  s/p S/P LIMA-LAD and SVG-MARLENE which are patent, Mid OM1 is 90% stenosed s/p SVG-OM-RPDA which is patent to a small-vessel branch of the OM, Distal RCA is 70% stenosed diffusely s/p SVG-OM-RPDA which is patent to a distal portion of the RPDA.  2021 NST, I cannot see results 8/27/24, was done at OSH, pt reports found nothing of concern.  Never had 'weak heart muscle' per pt, no echos available 8/27/24.  He reports VZV events during hernia surgery [id number]  Cyclist, heavy exertion, just rode [place] to [place] early 2024, cycles 20 miles or more per ride in recent weeks.  No toxic habits  Brother had MI young age, another brother had cabg      Today I have reviewed all pertinent labs/imaging/data including but not limited to:        Lab Units 09/14/24  0712 07/03/24  1229 02/27/24  1125   CREATININE mg/dL 0.99 0.88 0.94         Lab Results   Component Value Date    K 4.5 09/14/2024     No results found for: \"HGBA1C\"  Lab Results   Component Value Date    [id number] 1.663 02/27/2024     Lab Results   Component Value Date    LDLCALC 48 02/27/2024     No results found for: \"BNP\"   No " "results found for: \"NTBNP\"       TODAY'S PLAN:     09/25/24  Warm, euvolemic  No new cardiac complaints, feels generally well    We Reviewed his extensive recent cardiac workup and recent LHC at length  All questions answered  LHC generally reassuring, OMT for CAD has been escalated in conjunction with IC team    Cardiac pre op evaluation:  - the patient is low-moderate risk for non-cardiac surgery  - the patient likely can complete 4 mets exertion without issue  - no evidence of acute cardiac issue including decompensated HF, uncontrolled arrhythmia, severe symptomatic valvulopathy, or an ACS  - the patient is currently optimized from cardiac perspective  - no further cardiac testing planned at this time  - continue current home medications with any erendira-op modifications per anesthesia/surgery teams, in addition to my below recommendations  - anti-platelet plan: maintain on ASA 81 qd if able. If not per surgical preference can hold x 7 days preop.    Cw asa and high intens statin    Taken off bblocker years ago 2/2 hypotension/dizziness    No Rx changers today    Follow up:  With me in 3 mo. Sooner if Sx evolve.  In addition to follow up with their other medical providers    Key info from my prior notes:    Inconclusive stress echo  Note He says test was stopped before he was symptom limited  Long discussion about different pathways now, including complexities if he were to ever get PCI in setting of possible upcoming but as of yet unclear prostate surgery    Will pursue exercise NST now    I am meeting patient for the first time today  Warm, euvolemic  + increasing CP, substernal and L sided, sometimes better with exertion/cycling sometimes not  +life stress recently  Off ASA recently 2/2 prostate procedures, newly restarted last few days  No overt new SOB, has been doing less exercise recently and resting more    Minimal data available in epic prior to first meeting, cardiac test results do not actually populate " in epic/Sentrinsicwhere    Check echo rest and exercise stress echo    Prostate ca surgery planning underway    Studies:  Today I have reviewed all pertinent patient data/labs/imaging where available, including but not limited to the below studies. This includes my independent interpretation. Selected results may be displayed here but comprehensive listing is omitted for note clarity and can be found in the epic chart.    ECG.    Echo.    Stress.    Cath.    HPI:   73 y.o. male PMH and acute problems listed later in this note (a partial list may also be included within 'assessment' section) presents for consultation.  No new SOB/dizziness/palpitations/syncope.  No new unintentional weight changes.  No new leg swelling, PND, pillow orthopnea.  No new fevers, chills, cough, nausea, vomiting, diarrhea, dysuria.    Interval History:  As noted in 'plan' section above and prior epic chart notes.    No new SOB/dizziness/palpitations/syncope.  No new fatigue.  No new unintentional weight changes.  No new leg swelling, PND, pillow orthopnea.  No new fevers, chills, cough, nausea, vomiting, diarrhea, dysuria.    ROS:  10 point ROS negative except as specified in HPI    Past Medical History:   Diagnosis Date    Anesthesia complication     vaso-vagal  episodes x 3 in pacu monitored in cardiac ICU overnight    Colon polyp     GERD (gastroesophageal reflux disease)     Hx of CABG     Hyperlipidemia     Prostate cancer (HCC)      Patient Active Problem List   Diagnosis    Coronary artery disease involving native coronary artery of native heart without angina pectoris    GERD (gastroesophageal reflux disease)    Mild hyperlipidemia    Presence of aortocoronary bypass graft    Chondromalacia of knee    SOB (shortness of breath)    Other insomnia    Prostate cancer (HCC)     No current facility-administered medications for this visit.      Allergies   Allergen Reactions    Propofol Other (See Comments)     Vaso-vagal reaction  x 3 pt  monitored in cardiac ICU overnight     Social History     Socioeconomic History    Marital status: /Civil Union     Spouse name: Not on file    Number of children: Not on file    Years of education: Not on file    Highest education level: Not on file   Occupational History    Not on file   Tobacco Use    Smoking status: Never    Smokeless tobacco: Never   Vaping Use    Vaping status: Never Used   Substance and Sexual Activity    Alcohol use: Never    Drug use: Never    Sexual activity: Yes   Other Topics Concern    Not on file   Social History Narrative    Not on file     Social Determinants of Health     Financial Resource Strain: Low Risk  (2/11/2024)    Overall Financial Resource Strain (CARDIA)     Difficulty of Paying Living Expenses: Not very hard   Food Insecurity: Not on file   Transportation Needs: No Transportation Needs (2/11/2024)    PRAPARE - Transportation     Lack of Transportation (Medical): No     Lack of Transportation (Non-Medical): No   Physical Activity: Sufficiently Active (11/23/2020)    [facility] [employer] [employer]    Exercise Vital Sign     Days of Exercise per Week: 7 days     Minutes of Exercise per Session: 90 min   Stress: Not on file   Social Connections: Unknown (3/14/2021)    [facility]    Social Connections     Conversations with friends/family/neighbors per week: Not on file   Intimate Partner Violence: Not on file   Housing Stability: Low Risk  (7/10/2021)    [facility]    Housing Stability     Mortgage Payment Concerns?: Not on file     Number of Places Lived in the Last Year: Not on file     Unstable Housing?: Not on file     Family History   Problem Relation Age of Onset    Stroke Brother     Esophageal cancer Brother        Physical Exam:  Vitals:    09/25/24 1254   BP: 128/72   BP Location: Left arm   Patient  Position: Sitting   Cuff Size: Standard   Pulse: (!) 52   SpO2: 98%   Weight: 95.3 kg (210 lb)   Height: 6' (1.829 m)     Constitutional: NAD, non toxic  Ears/nose/mouth/throat: atraumatic  CV: RRR, nl S1S2, no murmurs/rubs/gallups, no JVD, no HJR  Resp: CTABL  GI: Soft, NTND  MSK: no swollen joints in exposed areas  Extr: No edema, warm LE  Pysche: Normal affect  Neuro: appropriate in conversation  Skin: dry and intact in exposed areas    Labs & Results:  Lab Results   Component Value Date    WBC 6.60 09/14/2024    HGB 16.4 09/14/2024    HCT 48.1 09/14/2024    MCV 96 09/14/2024     09/14/2024     Lab Results   Component Value Date    SODIUM 140 09/14/2024    K 4.5 09/14/2024     09/14/2024    CO2 28 09/14/2024    BUN 15 09/14/2024    CREATININE 0.99 09/14/2024    GLUC 87 07/03/2024    CALCIUM 9.1 09/14/2024       Counseling / Coordination of Care  Greater than 50% of total time was spent with the patient and / or family counseling and / or coordination of care. Discussion included diagnoses, most recent studies and any changes in treatment.    Thank you for the opportunity to participate in the care of this patient.    Jairon Mojica MD  Attending Physician  Advanced Heart Failure and Transplant Cardiology  [facility]

## 2024-09-25 ENCOUNTER — OFFICE VISIT (OUTPATIENT)
Dept: CARDIOLOGY CLINIC | Facility: CLINIC | Age: 74
End: 2024-09-25
Payer: MEDICARE

## 2024-09-25 VITALS
SYSTOLIC BLOOD PRESSURE: 128 MMHG | HEART RATE: 52 BPM | BODY MASS INDEX: 28.44 KG/M2 | OXYGEN SATURATION: 98 % | WEIGHT: 210 LBS | DIASTOLIC BLOOD PRESSURE: 72 MMHG | HEIGHT: 72 IN

## 2024-09-25 DIAGNOSIS — Z95.1 S/P CABG X 4: Primary | ICD-10-CM

## 2024-09-25 DIAGNOSIS — C61 PROSTATE CANCER (HCC): ICD-10-CM

## 2024-09-25 DIAGNOSIS — I25.10 CORONARY ARTERY DISEASE INVOLVING NATIVE HEART, UNSPECIFIED VESSEL OR LESION TYPE, UNSPECIFIED WHETHER ANGINA PRESENT: ICD-10-CM

## 2024-09-25 PROCEDURE — 99214 OFFICE O/P EST MOD 30 MIN: CPT | Performed by: STUDENT IN AN ORGANIZED HEALTH CARE EDUCATION/TRAINING PROGRAM

## 2024-09-25 PROCEDURE — G2211 COMPLEX E/M VISIT ADD ON: HCPCS | Performed by: STUDENT IN AN ORGANIZED HEALTH CARE EDUCATION/TRAINING PROGRAM

## 2024-10-03 ENCOUNTER — DOCUMENTATION (OUTPATIENT)
Dept: CARDIOLOGY CLINIC | Facility: CLINIC | Age: 74
End: 2024-10-03

## 2024-10-11 ENCOUNTER — HOSPITAL ENCOUNTER (OUTPATIENT)
Dept: NUCLEAR MEDICINE | Facility: HOSPITAL | Age: 74
End: 2024-10-11
Attending: UROLOGY
Payer: MEDICARE

## 2024-10-11 DIAGNOSIS — C61 PROSTATE CANCER (HCC): ICD-10-CM

## 2024-10-11 PROCEDURE — 78815 PET IMAGE W/CT SKULL-THIGH: CPT

## 2024-10-11 PROCEDURE — A9596 HB ILLUCCIX - GA 68 PSMA -11, PER MCI: HCPCS

## 2024-11-11 ENCOUNTER — TELEPHONE (OUTPATIENT)
Age: 74
End: 2024-11-11

## 2024-11-11 NOTE — TELEPHONE ENCOUNTER
Caller: Syeda    Doctor: Dr. Mojica     Reason for call: requesting a c opy of last EKG from 9/17 sent to fax#: [phone]    Call back#: [phone]

## 2024-11-22 ENCOUNTER — PATIENT OUTREACH (OUTPATIENT)
Dept: HEMATOLOGY ONCOLOGY | Facility: CLINIC | Age: 74
End: 2024-11-22

## 2024-11-22 NOTE — PROGRESS NOTES
Outreach made to patient. I LM introducing myself as  Oncology Patient Navigator. I let patient know I would like to introduce myself further and explain my role in his care moving forward. I gave my direct line and requested a call back from patient to assess barriers to care.

## 2024-11-27 DIAGNOSIS — G47.00 INSOMNIA, UNSPECIFIED TYPE: ICD-10-CM

## 2024-11-27 RX ORDER — ZOLPIDEM TARTRATE 5 MG/1
TABLET ORAL
Qty: 30 TABLET | Refills: 2 | Status: SHIPPED | OUTPATIENT
Start: 2024-11-27

## 2024-12-30 ENCOUNTER — OFFICE VISIT (OUTPATIENT)
Dept: CARDIOLOGY CLINIC | Facility: CLINIC | Age: 74
End: 2024-12-30
Payer: MEDICARE

## 2024-12-30 VITALS
HEIGHT: 72 IN | HEART RATE: 53 BPM | OXYGEN SATURATION: 96 % | SYSTOLIC BLOOD PRESSURE: 128 MMHG | DIASTOLIC BLOOD PRESSURE: 72 MMHG | BODY MASS INDEX: 28.17 KG/M2 | WEIGHT: 208 LBS

## 2024-12-30 DIAGNOSIS — E78.5 MILD HYPERLIPIDEMIA: ICD-10-CM

## 2024-12-30 DIAGNOSIS — Z95.1 S/P CABG X 4: Primary | ICD-10-CM

## 2024-12-30 DIAGNOSIS — C61 PROSTATE CANCER (HCC): ICD-10-CM

## 2024-12-30 PROCEDURE — 99214 OFFICE O/P EST MOD 30 MIN: CPT | Performed by: STUDENT IN AN ORGANIZED HEALTH CARE EDUCATION/TRAINING PROGRAM

## 2024-12-30 NOTE — PROGRESS NOTES
"Advanced Heart Failure/Pulmonary Hypertension Outpatient Note - Juan Abdul 74 y.o. male MRN: [id number]    @ Encounter: [id number]      Assessment:  74 y.o. male PMH and acute problems listed later in this note (a partial list may also be included within 'assessment' section) presents for consultation.  I first met Juan Abdul \"Red\" on 12/30/24.    Prior care in [place], new to [facility] cardiology as of 8/2024  Prostate ca, s/p RALP 11/22/24. LND deemed unnecessary.  Patient Active Problem List   Diagnosis    Coronary artery disease involving native coronary artery of native heart without angina pectoris    GERD (gastroesophageal reflux disease)    Mild hyperlipidemia    Presence of aortocoronary bypass graft    Chondromalacia of knee    SOB (shortness of breath)    Other insomnia    Prostate cancer (HCC)   CAD, S/p CABG 2019, had MI at that time.  9/2024 [facility]:   Severe Native CAD:  Mid LAD  s/p S/P LIMA-LAD and SVG-MARLENE which are patent, Mid OM1 is 90% stenosed s/p SVG-OM-RPDA which is patent to a small-vessel branch of the OM, Distal RCA is 70% stenosed diffusely s/p SVG-OM-RPDA which is patent to a distal portion of the RPDA.  Preserved LVEF 9/2024 2021 NST, I cannot see results 8/27/24, was done at OSH, pt reports found nothing of concern.  Never had 'weak heart muscle' per pt, no echos available as of 8/27/24.  He reports VZV events during hernia surgery [id number]  Cyclist, heavy exertion, just rode [place] to [place] early 2024, cycles 20 miles or more per ride in recent weeks.  No toxic habits  Brother had MI young age, another brother had cabg      Today I have reviewed all pertinent labs/imaging/data including but not limited to:        Lab Units 09/14/24  0712 07/03/24  1229 02/27/24  1125   CREATININE mg/dL 0.99 0.88 0.94         Lab Results   Component Value Date    K 4.5 09/14/2024     No results found for: \"HGBA1C\"  Lab Results   Component Value Date    KGA7HAFECKZP 1.663 02/27/2024     Lab Results " "  Component Value Date    LDLCALC 48 02/27/2024     No results found for: \"BNP\"   No results found for: \"NTBNP\"       TODAY'S PLAN:     12/30/24  Warm, euvolemic  No new cardiac complaints, feels generally well, slowly increasing exercise  BP acceptable    Cw asa and high intens statin    Taken off bblocker years ago 2/2 hypotension/dizziness  Note annalisa 2024    No Rx changers today    Advised TLC, weight loss exercise    May be able to come off norvasc in future    Follow up:  With me in 6 mo. Sooner if Sx evolve.  In addition to follow up with their other medical providers    Key info from my prior notes:    We Reviewed his extensive recent cardiac workup and recent LHC at length  All questions answered  LHC generally reassuring, OMT for CAD has been escalated in conjunction with IC team    Inconclusive stress echo  Note He says test was stopped before he was symptom limited  Long discussion about different pathways now, including complexities if he were to ever get PCI in setting of possible upcoming but as of yet unclear prostate surgery    Will pursue exercise NST now    I am meeting patient for the first time today  Warm, euvolemic  + increasing CP, substernal and L sided, sometimes better with exertion/cycling sometimes not  +life stress recently  Off ASA recently 2/2 prostate procedures, newly restarted last few days  No overt new SOB, has been doing less exercise recently and resting more    Minimal data available in epic prior to first meeting, cardiac [facility]    Check echo rest and exercise stress echo    Prostate ca surgery planning underway    Studies:  Today I have reviewed all pertinent patient data/labs/imaging where available, including but not limited to the below studies. This includes my independent interpretation. Selected results may be displayed here but comprehensive listing is omitted for note clarity and can be found in the epic " chart.    ECG.    Echo.    Stress.    Cath.    HPI:   74 y.o. male PMH and acute problems listed later in this note (a partial list may also be included within 'assessment' section) presents for consultation.  No new SOB/dizziness/palpitations/syncope.  No new unintentional weight changes.  No new leg swelling, PND, pillow orthopnea.  No new fevers, chills, cough, nausea, vomiting, diarrhea, dysuria.    Interval History:  As noted in 'plan' section above and prior epic chart notes.    No new SOB/dizziness/palpitations/syncope.  No new fatigue.  No new unintentional weight changes.  No new leg swelling, PND, pillow orthopnea.  No new fevers, chills, cough, nausea, vomiting, diarrhea, dysuria.    ROS:  10 point ROS negative except as specified in HPI    Past Medical History:   Diagnosis Date    Anesthesia complication     vaso-vagal  episodes x 3 in pacu monitored in cardiac ICU overnight    Colon polyp     GERD (gastroesophageal reflux disease)     Hx of CABG     Hyperlipidemia     Prostate cancer (HCC)      Patient Active Problem List   Diagnosis    Coronary artery disease involving native coronary artery of native heart without angina pectoris    GERD (gastroesophageal reflux disease)    Mild hyperlipidemia    Presence of aortocoronary bypass graft    Chondromalacia of knee    SOB (shortness of breath)    Other insomnia    Prostate cancer (HCC)     No current facility-administered medications for this visit.      Allergies   Allergen Reactions    Propofol Other (See Comments)     Vaso-vagal reaction  x 3 pt monitored in cardiac ICU overnight     Social History     Socioeconomic History    Marital status: /Civil Union     Spouse name: Not on file    Number of children: Not on file    Years of education: Not on file    Highest education level: Not on file   Occupational History    Not on file   Tobacco Use    Smoking status: Never    Smokeless tobacco: Never   Vaping Use    Vaping status: Never Used    Substance and Sexual Activity    Alcohol use: Never    Drug use: Never    Sexual activity: Yes   Other Topics Concern    Not on file   Social History Narrative    Not on file     Social Drivers of Health     Financial Resource Strain: Low Risk  (2/11/2024)    Overall Financial Resource Strain (CARDIA)     Difficulty of Paying Living Expenses: Not very hard   Food Insecurity: Not on file   Transportation Needs: No Transportation Needs (2/11/2024)    PRAPARE - Transportation     Lack of Transportation (Medical): No     Lack of Transportation (Non-Medical): No   Physical Activity: Sufficiently Active (11/23/2020)    Received from [facility]    Exercise Vital Sign     Days of Exercise per Week: 7 days     Minutes of Exercise per Session: 90 min   Stress: Not on file   Social Connections: Unknown (3/14/2021)    [facility]    Social Connections     Conversations with friends/family/neighbors per week: Not on file   Intimate Partner Violence: Not on file   Housing Stability: Low Risk  (7/10/2021)    [facility], [facility]    Housing Stability     Mortgage Payment Concerns?: Not on file     Number of Places Lived in the Last Year: Not on file     Unstable Housing?: Not on file     Family History   Problem Relation Age of Onset    Stroke Brother     Esophageal cancer Brother        Physical Exam:  Vitals:    12/30/24 1300   BP: 128/72   BP Location: Left arm   Patient Position: Sitting   Cuff Size: Standard   Pulse: (!) 53   SpO2: 96%   Weight: 94.3 kg (208 lb)   Height: 6' (1.829 m)       Constitutional: NAD, non toxic  Ears/nose/mouth/throat: atraumatic  CV: RRR, nl S1S2, no murmurs/rubs/gallups, no JVD, no HJR  Resp: CTABL  GI: Soft, NTND  MSK: no swollen joints in exposed areas  Extr: No edema, warm LE  Pysche: Normal affect  Neuro: appropriate in  conversation  Skin: dry and intact in exposed areas    Labs & Results:  Lab Results   Component Value Date    WBC 6.60 09/14/2024    HGB 16.4 09/14/2024    HCT 48.1 09/14/2024    MCV 96 09/14/2024     09/14/2024     Lab Results   Component Value Date    SODIUM 140 09/14/2024    K 4.5 09/14/2024     09/14/2024    CO2 28 09/14/2024    BUN 15 09/14/2024    CREATININE 0.99 09/14/2024    GLUC 87 07/03/2024    CALCIUM 9.1 09/14/2024       Counseling / Coordination of Care  Greater than 50% of total time was spent with the patient and / or family counseling and / or coordination of care. Discussion included diagnoses, most recent studies and any changes in treatment.    Thank you for the opportunity to participate in the care of this patient.    Jairon Mojica MD  Attending Physician  Advanced Heart Failure and Transplant Cardiology  [facility]

## 2024-12-31 ENCOUNTER — PATIENT OUTREACH (OUTPATIENT)
Dept: HEMATOLOGY ONCOLOGY | Facility: CLINIC | Age: 74
End: 2024-12-31

## 2025-01-02 DIAGNOSIS — I25.10 CORONARY ARTERY DISEASE INVOLVING NATIVE CORONARY ARTERY OF NATIVE HEART WITHOUT ANGINA PECTORIS: ICD-10-CM

## 2025-01-02 DIAGNOSIS — I25.10 CORONARY ARTERY DISEASE INVOLVING NATIVE HEART, UNSPECIFIED VESSEL OR LESION TYPE, UNSPECIFIED WHETHER ANGINA PRESENT: ICD-10-CM

## 2025-01-02 DIAGNOSIS — R06.02 SOB (SHORTNESS OF BREATH): ICD-10-CM

## 2025-01-02 DIAGNOSIS — Z95.1 S/P CABG X 4: ICD-10-CM

## 2025-01-03 ENCOUNTER — PATIENT OUTREACH (OUTPATIENT)
Dept: HEMATOLOGY ONCOLOGY | Facility: CLINIC | Age: 75
End: 2025-01-03

## 2025-01-03 RX ORDER — AMLODIPINE BESYLATE 2.5 MG/1
2.5 TABLET ORAL DAILY
Qty: 30 TABLET | Refills: 5 | Status: SHIPPED | OUTPATIENT
Start: 2025-01-03

## 2025-01-03 NOTE — PROGRESS NOTES
3rd outreach made to patient. I LM introducing myself as  Oncology Patient Navigator. I let patient know I would like to introduce myself further and explain my role in his care moving forward. I gave my direct line and requested a call back from patient to assess barriers to care.

## 2025-01-11 DIAGNOSIS — Z95.1 S/P CABG X 4: ICD-10-CM

## 2025-01-11 DIAGNOSIS — R06.02 SOB (SHORTNESS OF BREATH): ICD-10-CM

## 2025-01-11 DIAGNOSIS — I25.10 CORONARY ARTERY DISEASE INVOLVING NATIVE HEART, UNSPECIFIED VESSEL OR LESION TYPE, UNSPECIFIED WHETHER ANGINA PRESENT: ICD-10-CM

## 2025-01-11 DIAGNOSIS — I25.10 CORONARY ARTERY DISEASE INVOLVING NATIVE CORONARY ARTERY OF NATIVE HEART WITHOUT ANGINA PECTORIS: ICD-10-CM

## 2025-01-13 RX ORDER — ISOSORBIDE MONONITRATE 30 MG/1
TABLET, EXTENDED RELEASE ORAL
Qty: 90 TABLET | Refills: 1 | Status: SHIPPED | OUTPATIENT
Start: 2025-01-13

## 2025-02-04 ENCOUNTER — TELEPHONE (OUTPATIENT)
Age: 75
End: 2025-02-04

## 2025-02-04 DIAGNOSIS — E78.5 MILD HYPERLIPIDEMIA: Primary | ICD-10-CM

## 2025-02-04 DIAGNOSIS — I25.10 CORONARY ARTERY DISEASE INVOLVING NATIVE CORONARY ARTERY OF NATIVE HEART WITHOUT ANGINA PECTORIS: ICD-10-CM

## 2025-02-04 NOTE — TELEPHONE ENCOUNTER
Pt called to inquire if Dr Eric would like him to complete ay blood work before his upcoming appt for AWV. Please advise

## 2025-02-07 ENCOUNTER — APPOINTMENT (OUTPATIENT)
Dept: LAB | Facility: CLINIC | Age: 75
End: 2025-02-07
Payer: MEDICARE

## 2025-02-07 DIAGNOSIS — E78.5 MILD HYPERLIPIDEMIA: ICD-10-CM

## 2025-02-07 DIAGNOSIS — I25.10 CORONARY ARTERY DISEASE INVOLVING NATIVE CORONARY ARTERY OF NATIVE HEART WITHOUT ANGINA PECTORIS: ICD-10-CM

## 2025-02-07 LAB
ALBUMIN SERPL BCG-MCNC: 4.3 G/DL (ref 3.5–5)
ALP SERPL-CCNC: 120 U/L (ref 34–104)
ALT SERPL W P-5'-P-CCNC: 22 U/L (ref 7–52)
ANION GAP SERPL CALCULATED.3IONS-SCNC: 8 MMOL/L (ref 4–13)
AST SERPL W P-5'-P-CCNC: 22 U/L (ref 13–39)
BILIRUB SERPL-MCNC: 0.44 MG/DL (ref 0.2–1)
BUN SERPL-MCNC: 17 MG/DL (ref 5–25)
CALCIUM SERPL-MCNC: 9.4 MG/DL (ref 8.4–10.2)
CHLORIDE SERPL-SCNC: 107 MMOL/L (ref 96–108)
CHOLEST SERPL-MCNC: 113 MG/DL (ref ?–200)
CO2 SERPL-SCNC: 26 MMOL/L (ref 21–32)
CREAT SERPL-MCNC: 1.04 MG/DL (ref 0.6–1.3)
ERYTHROCYTE [DISTWIDTH] IN BLOOD BY AUTOMATED COUNT: 12.4 % (ref 11.6–15.1)
GFR SERPL CREATININE-BSD FRML MDRD: 70 ML/MIN/1.73SQ M
GLUCOSE P FAST SERPL-MCNC: 87 MG/DL (ref 65–99)
HCT VFR BLD AUTO: 47.4 % (ref 36.5–49.3)
HDLC SERPL-MCNC: 42 MG/DL
HGB BLD-MCNC: 15.9 G/DL (ref 12–17)
LDLC SERPL CALC-MCNC: 49 MG/DL (ref 0–100)
MCH RBC QN AUTO: 32.1 PG (ref 26.8–34.3)
MCHC RBC AUTO-ENTMCNC: 33.5 G/DL (ref 31.4–37.4)
MCV RBC AUTO: 96 FL (ref 82–98)
NONHDLC SERPL-MCNC: 71 MG/DL
PLATELET # BLD AUTO: 253 THOUSANDS/UL (ref 149–390)
PMV BLD AUTO: 9.9 FL (ref 8.9–12.7)
POTASSIUM SERPL-SCNC: 4.6 MMOL/L (ref 3.5–5.3)
PROT SERPL-MCNC: 6.7 G/DL (ref 6.4–8.4)
RBC # BLD AUTO: 4.95 MILLION/UL (ref 3.88–5.62)
SODIUM SERPL-SCNC: 141 MMOL/L (ref 135–147)
TRIGL SERPL-MCNC: 109 MG/DL (ref ?–150)
TSH SERPL DL<=0.05 MIU/L-ACNC: 2.01 UIU/ML (ref 0.45–4.5)
WBC # BLD AUTO: 7.7 THOUSAND/UL (ref 4.31–10.16)

## 2025-02-07 PROCEDURE — 80061 LIPID PANEL: CPT

## 2025-02-07 PROCEDURE — 84443 ASSAY THYROID STIM HORMONE: CPT

## 2025-02-07 PROCEDURE — 80053 COMPREHEN METABOLIC PANEL: CPT

## 2025-02-07 PROCEDURE — 85027 COMPLETE CBC AUTOMATED: CPT

## 2025-02-07 PROCEDURE — 36415 COLL VENOUS BLD VENIPUNCTURE: CPT

## 2025-02-11 ENCOUNTER — OFFICE VISIT (OUTPATIENT)
Dept: FAMILY MEDICINE CLINIC | Facility: CLINIC | Age: 75
End: 2025-02-11
Payer: MEDICARE

## 2025-02-11 VITALS
RESPIRATION RATE: 18 BRPM | BODY MASS INDEX: 29.68 KG/M2 | WEIGHT: 219.13 LBS | DIASTOLIC BLOOD PRESSURE: 80 MMHG | HEART RATE: 85 BPM | HEIGHT: 72 IN | OXYGEN SATURATION: 98 % | TEMPERATURE: 97.6 F | SYSTOLIC BLOOD PRESSURE: 140 MMHG

## 2025-02-11 DIAGNOSIS — I25.10 CORONARY ARTERY DISEASE INVOLVING NATIVE CORONARY ARTERY OF NATIVE HEART WITHOUT ANGINA PECTORIS: ICD-10-CM

## 2025-02-11 DIAGNOSIS — C61 PROSTATE CANCER (HCC): ICD-10-CM

## 2025-02-11 DIAGNOSIS — H43.812 VITREOUS DETACHMENT OF LEFT EYE: ICD-10-CM

## 2025-02-11 DIAGNOSIS — B35.1 ONYCHOMYCOSIS: Primary | ICD-10-CM

## 2025-02-11 PROBLEM — H43.819 VITREOUS DETACHMENT: Status: ACTIVE | Noted: 2025-02-11

## 2025-02-11 PROCEDURE — 99213 OFFICE O/P EST LOW 20 MIN: CPT | Performed by: FAMILY MEDICINE

## 2025-02-11 PROCEDURE — G2211 COMPLEX E/M VISIT ADD ON: HCPCS | Performed by: FAMILY MEDICINE

## 2025-02-11 PROCEDURE — G0439 PPPS, SUBSEQ VISIT: HCPCS | Performed by: FAMILY MEDICINE

## 2025-02-11 NOTE — ASSESSMENT & PLAN NOTE
Coronary artery disease.  Patient continues to do well after bypass surgery.  He denies any chest pain or shortness of breath.  He will continue current regimen of medications and follow-up with cardiologist as requested

## 2025-02-11 NOTE — ASSESSMENT & PLAN NOTE
History of vitreous detachment of left eye.  Patient was given referral to [facility] eye [facility] for further evaluation and monitoring of condition

## 2025-02-11 NOTE — PROGRESS NOTES
Name: Juan Abdul      : 1950      MRN: [id number]  Encounter Provider: Asad Eric MD  Encounter Date: 2025   Encounter department: [facility]    Assessment & Plan  Onychomycosis  Onychomycosis.  Patient was given referral to [facility] podiatry for evaluation treatment of toenail fungus  Orders:    Ambulatory Referral to Podiatry; Future    Prostate cancer (HCC)  Prostate cancer.  Patient recovering well after prostatectomy.  He continues to follow-up with [facility] every 4 months for monitoring of condition       Coronary artery disease involving native coronary artery of native heart without angina pectoris  Coronary artery disease.  Patient continues to do well after bypass surgery.  He denies any chest pain or shortness of breath.  He will continue current regimen of medications and follow-up with cardiologist as requested       Vitreous detachment of left eye  History of vitreous detachment of left eye.  Patient was given referral to [facility] for further evaluation and monitoring of condition            Preventive health issues were discussed with patient, and age appropriate screening tests were ordered as noted in patient's After Visit Summary. Personalized health advice and appropriate referrals for health education or preventive services given if needed, as noted in patient's After Visit Summary.    History of Present Illness     Patient in the office to discuss chronic medical conditions.  Patient underwent prostatectomy for prostate cancer 2024.  Has been feeling fairly well with recuperation.  He does still have some slight urinary incontinence for which she wears a urinary pad.  He has been starting to increase his exercise regimen as tolerated.  Patient is hoping to get back to his usual long-distance bicycle riding by spring or summer time.    Patient still under the care of cardiologist for his history of coronary artery  disease.  Patient denies any chest pain or shortness of breath.  He continues on current regimen of medications.    Patient did request referral to ophthalmologist for episode of vitreous detachment that was noted when he was in [place] and had a few follow-up visits.  It was recommended that he establish a relationship with ophthalmology office in [place] to keep an eye on the situation.  Fortunately patient does not have significant visual disturbance.    Patient also requested referral to podiatrist for suspected onychomycosis of toenails on bilateral feet.  Patient has minimal discomfort at this time.       Patient Care Team:  Asad Eric MD as PCP - General (Family Medicine)  Aram Agrawal MD (Radiation Oncology)    Review of Systems   Constitutional: Negative.    HENT: Negative.     Eyes: Negative.    Respiratory: Negative.     Cardiovascular: Negative.    Gastrointestinal: Negative.    Genitourinary:         As per HPI   Musculoskeletal: Negative.    Skin: Negative.    Neurological: Negative.    Psychiatric/Behavioral: Negative.       Medical History Reviewed by provider this encounter:  Meds       Annual Wellness Visit Questionnaire       Health Risk Assessment:   Patient rates overall health as very good. Patient feels that their physical health rating is slightly better. Patient is very satisfied with their life. Eyesight was rated as same. Hearing was rated as slightly worse. Patient feels that their emotional and mental health rating is much better. Patients states they are never, rarely angry. Patient states they are never, rarely unusually tired/fatigued. Pain experienced in the last 7 days has been none. Patient states that he has experienced no weight loss or gain in last 6 months.     Fall Risk Screening:   In the past year, patient has experienced: no history of falling in past year      Home Safety:  Patient does not have trouble with stairs inside or outside of their home. Patient has  working smoke alarms and has working carbon monoxide detector. Home safety hazards include: none.     Nutrition:   Current diet is Low Saturated Fat.     Medications:   Patient is currently taking over-the-counter supplements. OTC medications include: see medication list. Patient is able to manage medications.     Activities of Daily Living (ADLs)/Instrumental Activities of Daily Living (IADLs):   Walk and transfer into and out of bed and chair?: Yes  Dress and groom yourself?: Yes    Bathe or shower yourself?: Yes    Feed yourself? Yes  Do your laundry/housekeeping?: Yes  Manage your money, pay your bills and track your expenses?: Yes  Make your own meals?: Yes    Do your own shopping?: Yes    Durable Medical Equipment Suppliers  None    Previous Hospitalizations:   Any hospitalizations or ED visits within the last 12 months?: Yes    How many hospitalizations have you had in the last year?: 1-2    Hospitalization Comments: Cancer surgery    Advance Care Planning:   Living will: Yes    Durable POA for healthcare: Yes    Advanced directive: Yes      PREVENTIVE SCREENINGS      Cardiovascular Screening:    General: History Lipid Disorder and Screening Current      Diabetes Screening:     General: Screening Current      Colorectal Cancer Screening:     General: Screening Current      Prostate Cancer Screening:    General: History Prostate Cancer      Abdominal Aortic Aneurysm (AAA) Screening:    Risk factors include: age between 65-76 yo        Lung Cancer Screening:     General: Screening Not Indicated    Screening, Brief Intervention, and Referral to Treatment (SBIRT)     Screening  Typical number of drinks in a day: 0  Typical number of drinks in a week: 0  Interpretation: Low risk drinking behavior.    AUDIT-C Screenin) How often did you have a drink containing alcohol in the past year? never  2) How many drinks did you have on a typical day when you were drinking in the past year? 0  3) How often did you have  6 or more drinks on one occasion in the past year? never    AUDIT-C Score: 0  Interpretation: Score 0-3 (male): Negative screen for alcohol misuse    Single Item Drug Screening:  How often have you used an illegal drug (including marijuana) or a prescription medication for non-medical reasons in the past year? never    Single Item Drug Screen Score: 0  Interpretation: Negative screen for possible drug use disorder    Social Drivers of Health     Financial Resource Strain: Low Risk  (2/11/2024)    Overall Financial Resource Strain (CARDIA)     Difficulty of Paying Living Expenses: Not very hard   Food Insecurity: No Food Insecurity (2/6/2025)    Hunger Vital Sign     Worried About Running Out of Food in the Last Year: Never true     Ran Out of Food in the Last Year: Never true   Transportation Needs: No Transportation Needs (2/6/2025)    PRAPARE - Transportation     Lack of Transportation (Medical): No     Lack of Transportation (Non-Medical): No   Housing Stability: Low Risk  (2/6/2025)    Housing Stability Vital Sign     Unable to Pay for Housing in the Last Year: No     Number of Times Moved in the Last Year: 0     Homeless in the Last Year: No   Utilities: Not At Risk (2/6/2025)    [facility] Utilities     Threatened with loss of utilities: No     No results found.    Objective   /80 (Patient Position: Sitting, Cuff Size: Large)   Pulse 85   Temp 97.6 °F (36.4 °C) (Temporal)   Resp 18   Ht 6' (1.829 m)   Wt 99.4 kg (219 lb 2 oz)   SpO2 98%   BMI 29.72 kg/m²     Physical Exam  Vitals and nursing note reviewed.   Constitutional:       General: He is not in acute distress.     Appearance: Normal appearance. He is well-developed. He is not ill-appearing.   HENT:      Head: Normocephalic and atraumatic.   Eyes:      General:         Right eye: No discharge.         Left eye: No discharge.      Extraocular Movements: Extraocular movements intact.      Conjunctiva/sclera: Conjunctivae normal.      Pupils: Pupils  are equal, round, and reactive to light.   Neck:      Vascular: No carotid bruit.   Cardiovascular:      Rate and Rhythm: Normal rate and regular rhythm.      Heart sounds: Normal heart sounds. No murmur heard.  Pulmonary:      Effort: Pulmonary effort is normal. No respiratory distress.      Breath sounds: Normal breath sounds. No wheezing or rhonchi.   Abdominal:      General: Abdomen is flat. Bowel sounds are normal.      Palpations: Abdomen is soft.      Tenderness: There is no abdominal tenderness. There is no guarding or rebound.   Musculoskeletal:      Right lower leg: No edema.      Left lower leg: No edema.   Lymphadenopathy:      Cervical: No cervical adenopathy.   Skin:     General: Skin is warm and dry.      Capillary Refill: Capillary refill takes less than 2 seconds.   Neurological:      Mental Status: He is alert. Mental status is at baseline.   Psychiatric:         Mood and Affect: Mood normal.         Behavior: Behavior normal.         Thought Content: Thought content normal.         Judgment: Judgment normal.

## 2025-02-11 NOTE — ASSESSMENT & PLAN NOTE
Onychomycosis.  Patient was given referral to [facility]'s podiatry for evaluation treatment of toenail fungus  Orders:    Ambulatory Referral to Podiatry; Future

## 2025-02-11 NOTE — ASSESSMENT & PLAN NOTE
Prostate cancer.  Patient recovering well after prostatectomy.  He continues to follow-up with [facility] every 4 months for monitoring of condition

## 2025-03-01 ENCOUNTER — TRANSCRIBE ORDERS (OUTPATIENT)
Dept: LAB | Facility: CLINIC | Age: 75
End: 2025-03-01

## 2025-03-01 ENCOUNTER — APPOINTMENT (OUTPATIENT)
Dept: LAB | Facility: CLINIC | Age: 75
End: 2025-03-01
Payer: MEDICARE

## 2025-03-01 DIAGNOSIS — C61 MALIGNANT NEOPLASM OF PROSTATE (HCC): Primary | ICD-10-CM

## 2025-03-01 DIAGNOSIS — C61 MALIGNANT NEOPLASM OF PROSTATE (HCC): ICD-10-CM

## 2025-03-01 LAB — PSA SERPL-MCNC: <0.008 NG/ML (ref 0–4)

## 2025-03-01 PROCEDURE — G0103 PSA SCREENING: HCPCS

## 2025-03-01 PROCEDURE — 36415 COLL VENOUS BLD VENIPUNCTURE: CPT

## 2025-03-02 DIAGNOSIS — G47.00 INSOMNIA, UNSPECIFIED TYPE: ICD-10-CM

## 2025-03-03 RX ORDER — ZOLPIDEM TARTRATE 5 MG/1
TABLET ORAL
Qty: 30 TABLET | Refills: 3 | Status: SHIPPED | OUTPATIENT
Start: 2025-03-03

## 2025-03-18 ENCOUNTER — OFFICE VISIT (OUTPATIENT)
Dept: PODIATRY | Facility: CLINIC | Age: 75
End: 2025-03-18
Payer: MEDICARE

## 2025-03-18 VITALS — BODY MASS INDEX: 28.62 KG/M2 | WEIGHT: 211 LBS

## 2025-03-18 DIAGNOSIS — B35.1 ONYCHOMYCOSIS: Primary | ICD-10-CM

## 2025-03-18 PROCEDURE — 99203 OFFICE O/P NEW LOW 30 MIN: CPT | Performed by: PODIATRIST

## 2025-03-18 RX ORDER — TERBINAFINE HYDROCHLORIDE 250 MG/1
TABLET ORAL
Qty: 42 TABLET | Refills: 0 | Status: SHIPPED | OUTPATIENT
Start: 2025-03-18 | End: 2025-03-18

## 2025-03-18 NOTE — PROGRESS NOTES
Name: Juan Abdul      : 1950      MRN: [id number]  Encounter Provider: Chris Castelan DPM  Encounter Date: 3/18/2025   Encounter department: [facility] [place]    Explained the patient that he is dealing with onychomycosis affecting the toenails of both feet with left worse than right.  Explained that topical medications have a very poor success rate.  In order to treat this disorder, oral Lamisil would be needed but at a reduced dosage.  This patient has elevated alkaline phosphatase enzyme and is on Lipitor.  Recommended pulsed dose Lamisil as this dosage would not affect the liver however he was still likely have elevated enzyme..  Prescribed 42 tablets.  Patient to take 1 tablet daily for 2 weeks then discontinue for 2 weeks.  He should continue this regimen for 2 additional months.    Patient is uncertain as to whether he wants to take the oral medication.  Will be prescribed and patient will be rescheduled for 3 months.  :  Assessment & Plan  Onychomycosis             History of Present Illness   HPI  Juan Abdul is a 74 y.o. male who presents with concern regarding the condition of his toenails.  All toenails are thick and yellow with subungual debris with the left foot more affected than the right.  A few fingernails are also looking dystrophic.  Patient states that the nails have been an issue for his entire life.  He has tried topical antifungals to no avail.    On questioning, patient notes that he takes Lipitor on a regular basis.    I personally viewed a comprehensive metabolic panel dated 2025.  Alkaline phosphatase was elevated at 120 with a normal high of 104.    I personally reviewed a comprehensive metabolic panel dated 2024.  Alkaline phosphatase was 101.    I personally reviewed a comprehensive metabolic panel dated 2024.  Alkaline phosphatase was 109.          Review of Systems   Cardiovascular:         Coronary artery disease   Gastrointestinal:          GERD   Psychiatric/Behavioral: Negative.                Objective   Wt 95.7 kg (211 lb) Comment: told by the pt.  BMI 28.62 kg/m²      Physical Exam  Constitutional:       Appearance: Normal appearance.   Cardiovascular:      Pulses: Normal pulses.   Musculoskeletal:         General: Normal range of motion.   Skin:     Comments: All toenails of left foot are thick and yellow with subungual debris.  Less dystrophy noted on the right foot toenails.  No evidence of tinea pedis.   Neurological:      General: No focal deficit present.      Mental Status: He is oriented to person, place, and time.

## 2025-06-19 ENCOUNTER — PROCEDURE VISIT (OUTPATIENT)
Dept: PODIATRY | Facility: CLINIC | Age: 75
End: 2025-06-19
Payer: MEDICARE

## 2025-06-19 DIAGNOSIS — I73.00 RAYNAUD'S DISEASE WITHOUT GANGRENE: Primary | ICD-10-CM

## 2025-06-19 PROCEDURE — 99213 OFFICE O/P EST LOW 20 MIN: CPT | Performed by: PODIATRIST

## 2025-06-19 NOTE — PROGRESS NOTES
Name: Juan Abdul      : 1950      MRN: [id number]  Encounter Provider: Chris Castelan DPM  Encounter Date: 2025   Encounter department: [facility] [place]    Explained the patient that his arterial supply to his feet normal limits.  Symptoms suggest Raynaud's due to cold .  It is currently becoming warmer with summer months and I suspect that he will be fine.  Recommended wearing very warm socks and shoes when cycling and winter months.  Reappoint as needed  :  Assessment & Plan  Raynaud's disease without gangrene             History of Present Illness   HPI  Juan Abdul is a 74 y.o. male who presents to discuss a concern that he has regarding his feet.  Approximately 3 months ago, soon after his visit here for mycotic nail discussion, patient noted that his toes became swollen, red at the tips and uncomfortable.  The right third toe also became numb.  The patient attributed this to his back and neuropathy.  He started stretching out and thinks that it is improved the problem.    Presently, the toes are not swollen he does not have significant discomfort.  It is noted that this problem began in March and that he cycles in the cold weather with frequency.    At his initial visit here, he was placed on pulsed dose Lamisil but he did not fill that prescription.      Review of Systems   Gastrointestinal:         GERD   Skin:         Onychomycosis              Objective   There were no vitals taken for this visit.     Physical Exam  Constitutional:       Appearance: Normal appearance.     Cardiovascular:      Pulses: Normal pulses.     Musculoskeletal:         General: Normal range of motion.     Skin:     Comments: All toenails are thick and yellow with subungual debris.     Neurological:      General: No focal deficit present.      Mental Status: He is oriented to person, place, and time.      Comments: Angeline sign negative right foot, 3rd IS

## 2025-06-26 DIAGNOSIS — I25.10 CORONARY ARTERY DISEASE INVOLVING NATIVE HEART, UNSPECIFIED VESSEL OR LESION TYPE, UNSPECIFIED WHETHER ANGINA PRESENT: ICD-10-CM

## 2025-06-26 DIAGNOSIS — Z95.1 S/P CABG X 4: ICD-10-CM

## 2025-06-26 DIAGNOSIS — I25.10 CORONARY ARTERY DISEASE INVOLVING NATIVE CORONARY ARTERY OF NATIVE HEART WITHOUT ANGINA PECTORIS: ICD-10-CM

## 2025-06-26 DIAGNOSIS — R06.02 SOB (SHORTNESS OF BREATH): ICD-10-CM

## 2025-06-27 RX ORDER — AMLODIPINE BESYLATE 2.5 MG/1
2.5 TABLET ORAL DAILY
Qty: 30 TABLET | Refills: 5 | Status: SHIPPED | OUTPATIENT
Start: 2025-06-27

## 2025-06-28 DIAGNOSIS — G47.00 INSOMNIA, UNSPECIFIED TYPE: ICD-10-CM

## 2025-06-30 ENCOUNTER — TELEPHONE (OUTPATIENT)
Dept: CARDIOLOGY CLINIC | Facility: CLINIC | Age: 75
End: 2025-06-30

## 2025-06-30 RX ORDER — ZOLPIDEM TARTRATE 5 MG/1
TABLET ORAL
Qty: 30 TABLET | Refills: 3 | Status: SHIPPED | OUTPATIENT
Start: 2025-06-30

## 2025-06-30 NOTE — TELEPHONE ENCOUNTER
LVM on 6/30 stating 7/8 appt w Dr. Mojica needs to be cancelled and rescheduled. Gave number to call back.

## 2025-07-05 DIAGNOSIS — I25.10 CORONARY ARTERY DISEASE INVOLVING NATIVE HEART, UNSPECIFIED VESSEL OR LESION TYPE, UNSPECIFIED WHETHER ANGINA PRESENT: ICD-10-CM

## 2025-07-05 DIAGNOSIS — Z95.1 S/P CABG X 4: ICD-10-CM

## 2025-07-05 DIAGNOSIS — I25.10 CORONARY ARTERY DISEASE INVOLVING NATIVE CORONARY ARTERY OF NATIVE HEART WITHOUT ANGINA PECTORIS: ICD-10-CM

## 2025-07-05 DIAGNOSIS — R06.02 SOB (SHORTNESS OF BREATH): ICD-10-CM

## 2025-07-08 RX ORDER — ISOSORBIDE MONONITRATE 30 MG/1
TABLET, EXTENDED RELEASE ORAL
Qty: 90 TABLET | Refills: 1 | Status: SHIPPED | OUTPATIENT
Start: 2025-07-08

## 2025-07-24 DIAGNOSIS — I25.10 CORONARY ARTERY DISEASE INVOLVING NATIVE CORONARY ARTERY OF NATIVE HEART WITHOUT ANGINA PECTORIS: ICD-10-CM

## 2025-07-24 DIAGNOSIS — R06.02 SOB (SHORTNESS OF BREATH): ICD-10-CM

## 2025-07-24 DIAGNOSIS — Z95.1 S/P CABG X 4: ICD-10-CM

## 2025-07-24 DIAGNOSIS — I25.10 CORONARY ARTERY DISEASE INVOLVING NATIVE HEART, UNSPECIFIED VESSEL OR LESION TYPE, UNSPECIFIED WHETHER ANGINA PRESENT: ICD-10-CM

## 2025-07-25 RX ORDER — ISOSORBIDE MONONITRATE 30 MG/1
TABLET, EXTENDED RELEASE ORAL
Qty: 90 TABLET | Refills: 1 | Status: SHIPPED | OUTPATIENT
Start: 2025-07-25

## (undated) DEVICE — HOLDER PROBE URONAV BK8848

## (undated) DEVICE — PINNACLE INTRODUCER SHEATH: Brand: PINNACLE

## (undated) DEVICE — CATH F6INF TL JR 4 100CM: Brand: INFINITI

## (undated) DEVICE — SYSTEM TRANSPERINEAL ACCESS PRECISIONPOINT

## (undated) DEVICE — LUBRICANT JELLY SURGILUBE TUBE 2OZ FLIP TOP

## (undated) DEVICE — CHLORAPREP HI-LITE 26ML ORANGE

## (undated) DEVICE — MAX-CORE® DISPOSABLE CORE BIOPSY INSTRUMENT, 18G X 25CM: Brand: MAX-CORE

## (undated) DEVICE — NEEDLE SPINAL 22G X 3.5IN  QUINCKE

## (undated) DEVICE — MAX-CORE® DISPOSABLE CORE BIOPSY INSTRUMENT, 18G X 20CM: Brand: MAX-CORE

## (undated) DEVICE — GLOVE SRG BIOGEL 8

## (undated) DEVICE — SPECIMEN CONTAINER STERILE PEEL PACK

## (undated) DEVICE — SCD SEQUENTIAL COMPRESSION COMFORT SLEEVE MEDIUM KNEE LENGTH: Brand: KENDALL SCD

## (undated) DEVICE — DGW .035 FC J3MM 260CM TEF: Brand: EMERALD

## (undated) DEVICE — CATH F4 INF IM 100CM: Brand: INFINITI

## (undated) DEVICE — 3M™ TRANSPORE™ WHITE SURGICAL TAPE 1534-1, 1 INCH X 10 YARD (2,5CM X 9,1M), 12 ROLLS/CARTON 10 CARTONS/CASE: Brand: 3M™ TRANSPORE™

## (undated) DEVICE — PREP PAD BNS: Brand: CONVERTORS

## (undated) DEVICE — RENTAL PROBE ULTRASOUND DROP IN BK5000

## (undated) DEVICE — CATH DIAG 6FR IMPULSE 100CM FL4

## (undated) DEVICE — SPONGE 4 X 4 XRAY 16 PLY STRL LF RFD

## (undated) DEVICE — 3M™ IOBAN™ 2 ANTIMICROBIAL INCISE DRAPE 6650EZ: Brand: IOBAN™ 2

## (undated) DEVICE — UTILITY MARKER,BLACK WITH LABELS: Brand: DEVON

## (undated) DEVICE — TELFA NON-ADHERENT ABSORBENT DRESSING: Brand: TELFA

## (undated) DEVICE — DGW .035 FC J3MM 150CM TEF: Brand: EMERALD

## (undated) DEVICE — SYRINGE 10ML LL

## (undated) DEVICE — FORMALIN PRE-FILLED 10 PCT PROSTATE BIOPSY

## (undated) DEVICE — RAZOR STERILE

## (undated) DEVICE — CATH DIAG 5FR IMPULSE 110CM PIG

## (undated) DEVICE — INVIEW CLEAR LEGGINGS: Brand: CONVERTORS

## (undated) DEVICE — STERILE (2 X 30CM) LATEX COVER: Brand: PROCOVERS™

## (undated) DEVICE — SYRINGE,TOOMEY,IRRIGATION,70CC,STERILE: Brand: MEDLINE

## (undated) DEVICE — ULTRASOUND GEL STERILE FOIL PK

## (undated) DEVICE — NEEDLE 25G X 1 1/2